# Patient Record
Sex: MALE | Race: OTHER | HISPANIC OR LATINO | URBAN - METROPOLITAN AREA
[De-identification: names, ages, dates, MRNs, and addresses within clinical notes are randomized per-mention and may not be internally consistent; named-entity substitution may affect disease eponyms.]

---

## 2017-10-28 ENCOUNTER — EMERGENCY (EMERGENCY)
Facility: HOSPITAL | Age: 57
LOS: 1 days | Discharge: HOME | End: 2017-10-28
Attending: EMERGENCY MEDICINE | Admitting: EMERGENCY MEDICINE
Payer: COMMERCIAL

## 2017-10-28 VITALS
TEMPERATURE: 99 F | SYSTOLIC BLOOD PRESSURE: 142 MMHG | RESPIRATION RATE: 16 BRPM | HEART RATE: 97 BPM | DIASTOLIC BLOOD PRESSURE: 81 MMHG | OXYGEN SATURATION: 99 %

## 2017-10-28 VITALS
OXYGEN SATURATION: 98 % | WEIGHT: 244.93 LBS | RESPIRATION RATE: 18 BRPM | SYSTOLIC BLOOD PRESSURE: 173 MMHG | DIASTOLIC BLOOD PRESSURE: 101 MMHG | HEART RATE: 109 BPM | TEMPERATURE: 98 F | HEIGHT: 69 IN

## 2017-10-28 DIAGNOSIS — S89.92XA UNSPECIFIED INJURY OF LEFT LOWER LEG, INITIAL ENCOUNTER: ICD-10-CM
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
## 2017-10-28 DIAGNOSIS — S09.90XA UNSPECIFIED INJURY OF HEAD, INITIAL ENCOUNTER: ICD-10-CM

## 2017-10-28 DIAGNOSIS — S89.91XA UNSPECIFIED INJURY OF RIGHT LOWER LEG, INITIAL ENCOUNTER: ICD-10-CM
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
## 2017-10-28 DIAGNOSIS — Z87.891 PERSONAL HISTORY OF NICOTINE DEPENDENCE: ICD-10-CM

## 2017-10-28 PROCEDURE — 73562 X-RAY EXAM OF KNEE 3: CPT | Mod: 26,50

## 2017-10-28 PROCEDURE — 99284 EMERGENCY DEPT VISIT MOD MDM: CPT | Mod: 25

## 2017-10-28 PROCEDURE — 73562 X-RAY EXAM OF KNEE 3: CPT

## 2017-10-28 PROCEDURE — 99284 EMERGENCY DEPT VISIT MOD MDM: CPT

## 2017-10-28 PROCEDURE — 72125 CT NECK SPINE W/O DYE: CPT

## 2017-10-28 PROCEDURE — 70450 CT HEAD/BRAIN W/O DYE: CPT

## 2017-10-28 PROCEDURE — 70450 CT HEAD/BRAIN W/O DYE: CPT | Mod: 26

## 2017-10-28 PROCEDURE — 72125 CT NECK SPINE W/O DYE: CPT | Mod: 26

## 2017-10-28 RX ORDER — IBUPROFEN 200 MG
600 TABLET ORAL ONCE
Qty: 0 | Refills: 0 | Status: COMPLETED | OUTPATIENT
Start: 2017-10-28 | End: 2017-10-28

## 2017-10-28 RX ADMIN — Medication 600 MILLIGRAM(S): at 15:01

## 2017-10-28 NOTE — ED PROVIDER NOTE - OBJECTIVE STATEMENT
pt with hx htn, low back pain and b/l meniscus surg c/o headache , b/l knee pain s/p fall on steps at home last night. pt relates was at least half way up steps carrying somethings, slipped fell backwards. no neck or back pain, weakness, numbness, cp, sob, abd pain, arm pain.  pmd - guttierez  ortho - none

## 2017-10-28 NOTE — ED PROVIDER NOTE - CONDUCTED A DETAILED DISCUSSION WITH PATIENT AND/OR GUARDIAN REGARDING, MDM
return to ED if symptoms worsen, persist or questions arise/need for outpatient follow-up need for outpatient follow-up/radiology results/return to ED if symptoms worsen, persist or questions arise

## 2017-10-28 NOTE — ED PROVIDER NOTE - CARE PLAN
Principal Discharge DX:	Fall, initial encounter  Secondary Diagnosis:	Knee injury, initial encounter  Secondary Diagnosis:	Injury of head, initial encounter

## 2017-10-28 NOTE — ED ADULT NURSE NOTE - OBJECTIVE STATEMENT
Pt received on wheel chair , alert and oriented x3, c/o lower leg pain. Pt stated he fell at home, landed on both knees, 10/10 sharp pain radiating to leg, pt took aleve and advil at home, no relief, pain worsen and is now constant. Pt denies hitting his head, denies loss of consciousness,  pt stated occasional numbness and tingling. No acute s/s of distress noted. No visible injuries or bleeding noted.

## 2020-06-30 ENCOUNTER — RESULT REVIEW (OUTPATIENT)
Age: 60
End: 2020-06-30

## 2020-11-27 PROBLEM — M54.5 LOW BACK PAIN: Chronic | Status: ACTIVE | Noted: 2017-10-28

## 2020-12-01 ENCOUNTER — OUTPATIENT (OUTPATIENT)
Dept: OUTPATIENT SERVICES | Facility: HOSPITAL | Age: 60
LOS: 1 days | End: 2020-12-01
Payer: COMMERCIAL

## 2020-12-01 VITALS
RESPIRATION RATE: 16 BRPM | OXYGEN SATURATION: 100 % | SYSTOLIC BLOOD PRESSURE: 145 MMHG | HEIGHT: 70 IN | DIASTOLIC BLOOD PRESSURE: 94 MMHG | WEIGHT: 242.95 LBS | HEART RATE: 79 BPM | TEMPERATURE: 99 F

## 2020-12-01 DIAGNOSIS — Z01.818 ENCOUNTER FOR OTHER PREPROCEDURAL EXAMINATION: ICD-10-CM

## 2020-12-01 DIAGNOSIS — M17.11 UNILATERAL PRIMARY OSTEOARTHRITIS, RIGHT KNEE: ICD-10-CM

## 2020-12-01 LAB
A1C WITH ESTIMATED AVERAGE GLUCOSE RESULT: 5.2 % — SIGNIFICANT CHANGE UP (ref 4–5.6)
ALBUMIN SERPL ELPH-MCNC: 4.1 G/DL — SIGNIFICANT CHANGE UP (ref 3.3–5)
ALP SERPL-CCNC: 89 U/L — SIGNIFICANT CHANGE UP (ref 40–120)
ALT FLD-CCNC: 29 U/L — SIGNIFICANT CHANGE UP (ref 12–78)
ANION GAP SERPL CALC-SCNC: 5 MMOL/L — SIGNIFICANT CHANGE UP (ref 5–17)
APTT BLD: 30.7 SEC — SIGNIFICANT CHANGE UP (ref 27.5–35.5)
AST SERPL-CCNC: 16 U/L — SIGNIFICANT CHANGE UP (ref 15–37)
BILIRUB SERPL-MCNC: 0.5 MG/DL — SIGNIFICANT CHANGE UP (ref 0.2–1.2)
BUN SERPL-MCNC: 23 MG/DL — SIGNIFICANT CHANGE UP (ref 7–23)
CALCIUM SERPL-MCNC: 9.5 MG/DL — SIGNIFICANT CHANGE UP (ref 8.5–10.1)
CHLORIDE SERPL-SCNC: 109 MMOL/L — HIGH (ref 96–108)
CO2 SERPL-SCNC: 29 MMOL/L — SIGNIFICANT CHANGE UP (ref 22–31)
CREAT SERPL-MCNC: 1.6 MG/DL — HIGH (ref 0.5–1.3)
ESTIMATED AVERAGE GLUCOSE: 103 MG/DL — SIGNIFICANT CHANGE UP (ref 68–114)
GLUCOSE SERPL-MCNC: 104 MG/DL — HIGH (ref 70–99)
HCT VFR BLD CALC: 41.6 % — SIGNIFICANT CHANGE UP (ref 39–50)
HGB BLD-MCNC: 13.5 G/DL — SIGNIFICANT CHANGE UP (ref 13–17)
INR BLD: 1.05 RATIO — SIGNIFICANT CHANGE UP (ref 0.88–1.16)
MCHC RBC-ENTMCNC: 28.2 PG — SIGNIFICANT CHANGE UP (ref 27–34)
MCHC RBC-ENTMCNC: 32.5 GM/DL — SIGNIFICANT CHANGE UP (ref 32–36)
MCV RBC AUTO: 86.8 FL — SIGNIFICANT CHANGE UP (ref 80–100)
MRSA PCR RESULT.: SIGNIFICANT CHANGE UP
NRBC # BLD: 0 /100 WBCS — SIGNIFICANT CHANGE UP (ref 0–0)
PLATELET # BLD AUTO: 270 K/UL — SIGNIFICANT CHANGE UP (ref 150–400)
POTASSIUM SERPL-MCNC: 4.1 MMOL/L — SIGNIFICANT CHANGE UP (ref 3.5–5.3)
POTASSIUM SERPL-SCNC: 4.1 MMOL/L — SIGNIFICANT CHANGE UP (ref 3.5–5.3)
PROT SERPL-MCNC: 8.2 G/DL — SIGNIFICANT CHANGE UP (ref 6–8.3)
PROTHROM AB SERPL-ACNC: 12.3 SEC — SIGNIFICANT CHANGE UP (ref 10.6–13.6)
RBC # BLD: 4.79 M/UL — SIGNIFICANT CHANGE UP (ref 4.2–5.8)
RBC # FLD: 14.4 % — SIGNIFICANT CHANGE UP (ref 10.3–14.5)
S AUREUS DNA NOSE QL NAA+PROBE: SIGNIFICANT CHANGE UP
SODIUM SERPL-SCNC: 143 MMOL/L — SIGNIFICANT CHANGE UP (ref 135–145)
WBC # BLD: 6.65 K/UL — SIGNIFICANT CHANGE UP (ref 3.8–10.5)
WBC # FLD AUTO: 6.65 K/UL — SIGNIFICANT CHANGE UP (ref 3.8–10.5)

## 2020-12-01 PROCEDURE — 36415 COLL VENOUS BLD VENIPUNCTURE: CPT

## 2020-12-01 PROCEDURE — 93010 ELECTROCARDIOGRAM REPORT: CPT

## 2020-12-01 PROCEDURE — 93005 ELECTROCARDIOGRAM TRACING: CPT

## 2020-12-01 PROCEDURE — 85730 THROMBOPLASTIN TIME PARTIAL: CPT

## 2020-12-01 PROCEDURE — 73562 X-RAY EXAM OF KNEE 3: CPT

## 2020-12-01 PROCEDURE — 86900 BLOOD TYPING SEROLOGIC ABO: CPT

## 2020-12-01 PROCEDURE — 73562 X-RAY EXAM OF KNEE 3: CPT | Mod: 26,RT

## 2020-12-01 PROCEDURE — 86850 RBC ANTIBODY SCREEN: CPT

## 2020-12-01 PROCEDURE — 87641 MR-STAPH DNA AMP PROBE: CPT

## 2020-12-01 PROCEDURE — 86901 BLOOD TYPING SEROLOGIC RH(D): CPT

## 2020-12-01 PROCEDURE — 83036 HEMOGLOBIN GLYCOSYLATED A1C: CPT

## 2020-12-01 PROCEDURE — 87640 STAPH A DNA AMP PROBE: CPT

## 2020-12-01 PROCEDURE — 85027 COMPLETE CBC AUTOMATED: CPT

## 2020-12-01 PROCEDURE — G0463: CPT

## 2020-12-01 PROCEDURE — 80053 COMPREHEN METABOLIC PANEL: CPT

## 2020-12-01 PROCEDURE — 85610 PROTHROMBIN TIME: CPT

## 2020-12-01 NOTE — H&P PST ADULT - NSICDXPROBLEM_GEN_ALL_CORE_FT
PROBLEM DIAGNOSES  Problem: Unilateral primary osteoarthritis, right knee  Assessment and Plan: check labs cbc cmp ptp tt type and screen hgb a1c mrsa  ekg  medical clearance  hibiclens x3  days with written and verbal instructions givne  patient is aware that he will be tested for covid and should socially isolate after test  patient is aware that if he is positive for mrsa he will be treated with mupirocin ointment written and verbal instrucions wee given  patient is aware that if his ghgb a1c is 9 or greater he will need to see endocrinologist  take losartan with a sip of water the morning of surgery  stop aspirin 7 days before surgery  preop instructions were amanda and patient verbalized understanding

## 2020-12-01 NOTE — H&P PST ADULT - NSICDXPASTMEDICALHX_GEN_ALL_CORE_FT
PAST MEDICAL HISTORY:  Hypertension     Knee injury     Low back pain     Unilateral primary osteoarthritis, right knee

## 2020-12-01 NOTE — H&P PST ADULT - NSANTHSNORERD_ENT_A_CORE
PAST MEDICAL HISTORY:  Cancer, bladder, neck     Chronic back pain     GERD with apnea     Hepatitis B ?    Hiatal hernia     Neck pain LTD ROM    Other osteoarthritis of spine, lumbosacral region     PUD (peptic ulcer disease)     Tingling sensation head    Vertigo Yes

## 2020-12-01 NOTE — H&P PST ADULT - CIGARETTES, PACKS/DAY
You can access the FollowMyHealth Patient Portal offered by [facility] by registering at the following website: [email or web address]. By joining [employer]’s FollowMyHealth portal, you will also be able to view your health information using other applications (apps) compatible with our system. 1

## 2020-12-01 NOTE — H&P PST ADULT - NSICDXPASTSURGICALHX_GEN_ALL_CORE_FT
PAST SURGICAL HISTORY:  H/O hammer toe correction July 2020    H/O knee surgery left replacement 10/28/20

## 2020-12-01 NOTE — H&P PST ADULT - HISTORY OF PRESENT ILLNESS
59 yo male scheduled for Right Total Knee Replacement on 12/14/20 with Dr Thomas.  Patient states he has paIin in the right knee for 20 years.  Pain is currently 7/10.  Elevation improves pain

## 2020-12-11 ENCOUNTER — OUTPATIENT (OUTPATIENT)
Dept: OUTPATIENT SERVICES | Facility: HOSPITAL | Age: 60
LOS: 1 days | End: 2020-12-11
Payer: COMMERCIAL

## 2020-12-11 DIAGNOSIS — Z11.59 ENCOUNTER FOR SCREENING FOR OTHER VIRAL DISEASES: ICD-10-CM

## 2020-12-11 LAB — SARS-COV-2 RNA SPEC QL NAA+PROBE: SIGNIFICANT CHANGE UP

## 2020-12-11 PROCEDURE — U0003: CPT

## 2020-12-13 ENCOUNTER — TRANSCRIPTION ENCOUNTER (OUTPATIENT)
Age: 60
End: 2020-12-13

## 2020-12-14 ENCOUNTER — INPATIENT (INPATIENT)
Facility: HOSPITAL | Age: 60
LOS: 0 days | Discharge: HOME | DRG: 470 | End: 2020-12-15
Attending: ORTHOPAEDIC SURGERY | Admitting: ORTHOPAEDIC SURGERY
Payer: COMMERCIAL

## 2020-12-14 ENCOUNTER — RESULT REVIEW (OUTPATIENT)
Age: 60
End: 2020-12-14

## 2020-12-14 ENCOUNTER — TRANSCRIPTION ENCOUNTER (OUTPATIENT)
Age: 60
End: 2020-12-14

## 2020-12-14 VITALS
DIASTOLIC BLOOD PRESSURE: 146 MMHG | HEART RATE: 90 BPM | OXYGEN SATURATION: 97 % | SYSTOLIC BLOOD PRESSURE: 189 MMHG | WEIGHT: 242.95 LBS | RESPIRATION RATE: 16 BRPM | TEMPERATURE: 100 F | HEIGHT: 70 IN

## 2020-12-14 DIAGNOSIS — M17.11 UNILATERAL PRIMARY OSTEOARTHRITIS, RIGHT KNEE: ICD-10-CM

## 2020-12-14 DIAGNOSIS — I10 ESSENTIAL (PRIMARY) HYPERTENSION: ICD-10-CM

## 2020-12-14 DIAGNOSIS — N17.9 ACUTE KIDNEY FAILURE, UNSPECIFIED: ICD-10-CM

## 2020-12-14 LAB
ABO RH CONFIRMATION: SIGNIFICANT CHANGE UP
HCT VFR BLD CALC: 39.5 % — SIGNIFICANT CHANGE UP (ref 39–50)
HGB BLD-MCNC: 12.7 G/DL — LOW (ref 13–17)
MCHC RBC-ENTMCNC: 28 PG — SIGNIFICANT CHANGE UP (ref 27–34)
MCHC RBC-ENTMCNC: 32.2 GM/DL — SIGNIFICANT CHANGE UP (ref 32–36)
MCV RBC AUTO: 87 FL — SIGNIFICANT CHANGE UP (ref 80–100)
NRBC # BLD: 0 /100 WBCS — SIGNIFICANT CHANGE UP (ref 0–0)
PLATELET # BLD AUTO: 357 K/UL — SIGNIFICANT CHANGE UP (ref 150–400)
RBC # BLD: 4.54 M/UL — SIGNIFICANT CHANGE UP (ref 4.2–5.8)
RBC # FLD: 14.1 % — SIGNIFICANT CHANGE UP (ref 10.3–14.5)
WBC # BLD: 14.13 K/UL — HIGH (ref 3.8–10.5)
WBC # FLD AUTO: 14.13 K/UL — HIGH (ref 3.8–10.5)

## 2020-12-14 PROCEDURE — 73562 X-RAY EXAM OF KNEE 3: CPT | Mod: 26,RT

## 2020-12-14 PROCEDURE — 88305 TISSUE EXAM BY PATHOLOGIST: CPT | Mod: 26

## 2020-12-14 PROCEDURE — 88311 DECALCIFY TISSUE: CPT | Mod: 26

## 2020-12-14 RX ORDER — ONDANSETRON 8 MG/1
4 TABLET, FILM COATED ORAL EVERY 6 HOURS
Refills: 0 | Status: DISCONTINUED | OUTPATIENT
Start: 2020-12-14 | End: 2020-12-15

## 2020-12-14 RX ORDER — SODIUM CHLORIDE 9 MG/ML
1000 INJECTION INTRAMUSCULAR; INTRAVENOUS; SUBCUTANEOUS
Refills: 0 | Status: DISCONTINUED | OUTPATIENT
Start: 2020-12-14 | End: 2020-12-15

## 2020-12-14 RX ORDER — MAGNESIUM HYDROXIDE 400 MG/1
30 TABLET, CHEWABLE ORAL DAILY
Refills: 0 | Status: DISCONTINUED | OUTPATIENT
Start: 2020-12-14 | End: 2020-12-15

## 2020-12-14 RX ORDER — FERROUS SULFATE 325(65) MG
325 TABLET ORAL
Refills: 0 | Status: DISCONTINUED | OUTPATIENT
Start: 2020-12-14 | End: 2020-12-15

## 2020-12-14 RX ORDER — FOLIC ACID 0.8 MG
1 TABLET ORAL DAILY
Refills: 0 | Status: DISCONTINUED | OUTPATIENT
Start: 2020-12-14 | End: 2020-12-15

## 2020-12-14 RX ORDER — CEFAZOLIN SODIUM 1 G
2000 VIAL (EA) INJECTION ONCE
Refills: 0 | Status: COMPLETED | OUTPATIENT
Start: 2020-12-14 | End: 2020-12-14

## 2020-12-14 RX ORDER — RIVAROXABAN 15 MG-20MG
10 KIT ORAL DAILY
Refills: 0 | Status: DISCONTINUED | OUTPATIENT
Start: 2020-12-15 | End: 2020-12-15

## 2020-12-14 RX ORDER — SODIUM CHLORIDE 9 MG/ML
1000 INJECTION, SOLUTION INTRAVENOUS
Refills: 0 | Status: DISCONTINUED | OUTPATIENT
Start: 2020-12-14 | End: 2020-12-14

## 2020-12-14 RX ORDER — OXYCODONE HYDROCHLORIDE 5 MG/1
5 TABLET ORAL ONCE
Refills: 0 | Status: DISCONTINUED | OUTPATIENT
Start: 2020-12-14 | End: 2020-12-14

## 2020-12-14 RX ORDER — HYDROMORPHONE HYDROCHLORIDE 2 MG/ML
0.5 INJECTION INTRAMUSCULAR; INTRAVENOUS; SUBCUTANEOUS
Refills: 0 | Status: DISCONTINUED | OUTPATIENT
Start: 2020-12-14 | End: 2020-12-14

## 2020-12-14 RX ORDER — ONDANSETRON 8 MG/1
4 TABLET, FILM COATED ORAL ONCE
Refills: 0 | Status: DISCONTINUED | OUTPATIENT
Start: 2020-12-14 | End: 2020-12-14

## 2020-12-14 RX ORDER — TRAMADOL HYDROCHLORIDE 50 MG/1
50 TABLET ORAL EVERY 4 HOURS
Refills: 0 | Status: DISCONTINUED | OUTPATIENT
Start: 2020-12-14 | End: 2020-12-15

## 2020-12-14 RX ORDER — ACETAMINOPHEN 500 MG
1000 TABLET ORAL ONCE
Refills: 0 | Status: COMPLETED | OUTPATIENT
Start: 2020-12-15 | End: 2020-12-15

## 2020-12-14 RX ORDER — ASPIRIN/CALCIUM CARB/MAGNESIUM 324 MG
325 TABLET ORAL DAILY
Refills: 0 | Status: DISCONTINUED | OUTPATIENT
Start: 2020-12-14 | End: 2020-12-15

## 2020-12-14 RX ORDER — TRAMADOL HYDROCHLORIDE 50 MG/1
100 TABLET ORAL EVERY 8 HOURS
Refills: 0 | Status: DISCONTINUED | OUTPATIENT
Start: 2020-12-14 | End: 2020-12-15

## 2020-12-14 RX ORDER — ACETAMINOPHEN 500 MG
1000 TABLET ORAL ONCE
Refills: 0 | Status: COMPLETED | OUTPATIENT
Start: 2020-12-14 | End: 2020-12-14

## 2020-12-14 RX ORDER — BUPIVACAINE 13.3 MG/ML
20 INJECTION, SUSPENSION, LIPOSOMAL INFILTRATION ONCE
Refills: 0 | Status: COMPLETED | OUTPATIENT
Start: 2020-12-14 | End: 2020-12-14

## 2020-12-14 RX ORDER — ACETAMINOPHEN 500 MG
650 TABLET ORAL EVERY 6 HOURS
Refills: 0 | Status: DISCONTINUED | OUTPATIENT
Start: 2020-12-14 | End: 2020-12-15

## 2020-12-14 RX ORDER — SENNA PLUS 8.6 MG/1
2 TABLET ORAL AT BEDTIME
Refills: 0 | Status: DISCONTINUED | OUTPATIENT
Start: 2020-12-14 | End: 2020-12-15

## 2020-12-14 RX ORDER — LOSARTAN POTASSIUM 100 MG/1
50 TABLET, FILM COATED ORAL DAILY
Refills: 0 | Status: DISCONTINUED | OUTPATIENT
Start: 2020-12-14 | End: 2020-12-14

## 2020-12-14 RX ORDER — ASCORBIC ACID 60 MG
500 TABLET,CHEWABLE ORAL
Refills: 0 | Status: DISCONTINUED | OUTPATIENT
Start: 2020-12-14 | End: 2020-12-15

## 2020-12-14 RX ADMIN — SODIUM CHLORIDE 75 MILLILITER(S): 9 INJECTION, SOLUTION INTRAVENOUS at 14:35

## 2020-12-14 RX ADMIN — Medication 400 MILLIGRAM(S): at 21:00

## 2020-12-14 RX ADMIN — Medication 1000 MILLIGRAM(S): at 21:45

## 2020-12-14 RX ADMIN — HYDROMORPHONE HYDROCHLORIDE 0.5 MILLIGRAM(S): 2 INJECTION INTRAMUSCULAR; INTRAVENOUS; SUBCUTANEOUS at 14:50

## 2020-12-14 RX ADMIN — SODIUM CHLORIDE 75 MILLILITER(S): 9 INJECTION, SOLUTION INTRAVENOUS at 08:37

## 2020-12-14 RX ADMIN — HYDROMORPHONE HYDROCHLORIDE 0.5 MILLIGRAM(S): 2 INJECTION INTRAMUSCULAR; INTRAVENOUS; SUBCUTANEOUS at 15:05

## 2020-12-14 RX ADMIN — HYDROMORPHONE HYDROCHLORIDE 0.5 MILLIGRAM(S): 2 INJECTION INTRAMUSCULAR; INTRAVENOUS; SUBCUTANEOUS at 14:16

## 2020-12-14 RX ADMIN — HYDROMORPHONE HYDROCHLORIDE 0.5 MILLIGRAM(S): 2 INJECTION INTRAMUSCULAR; INTRAVENOUS; SUBCUTANEOUS at 14:31

## 2020-12-14 RX ADMIN — OXYCODONE HYDROCHLORIDE 5 MILLIGRAM(S): 5 TABLET ORAL at 17:45

## 2020-12-14 RX ADMIN — Medication 100 MILLIGRAM(S): at 21:51

## 2020-12-14 RX ADMIN — HYDROMORPHONE HYDROCHLORIDE 0.5 MILLIGRAM(S): 2 INJECTION INTRAMUSCULAR; INTRAVENOUS; SUBCUTANEOUS at 14:48

## 2020-12-14 RX ADMIN — HYDROMORPHONE HYDROCHLORIDE 0.5 MILLIGRAM(S): 2 INJECTION INTRAMUSCULAR; INTRAVENOUS; SUBCUTANEOUS at 14:33

## 2020-12-14 NOTE — DISCHARGE NOTE PROVIDER - PROVIDER TOKENS
PROVIDER:[TOKEN:[[id number]]] PROVIDER:[TOKEN:[[id number]]],PROVIDER:[TOKEN:[[id number]],FOLLOWUP:[1 week]]

## 2020-12-14 NOTE — PHYSICAL THERAPY INITIAL EVALUATION ADULT - RANGE OF MOTION EXAMINATION, REHAB EVAL
bilateral upper extremity ROM was WNL (within normal limits)/Left LE ROM was WNL (within normal limits)/R Knee: 0-80

## 2020-12-14 NOTE — DISCHARGE NOTE PROVIDER - CARE PROVIDER_API CALL
Mendez Thomas  ORTHOPAEDIC SURGERY  [address] [place] [address]  Phone: [phone]  Fax: [phone]  Follow Up Time:    Mendez Thomas  ORTHOPAEDIC SURGERY  [address] [place] [address]  Phone: [phone]  Fax: [phone]  Follow Up Time:     [facility]  [address] [place] [address]  Phone: [phone]  Fax: [phone]  Follow Up Time: 1 week

## 2020-12-14 NOTE — DISCHARGE NOTE PROVIDER - HOSPITAL COURSE
59 y/o evaluated in the office for right knee pain with endstage osteoarthritis. Patient underwent R TKA. Postoperatively, the patient was anticoagulated with Xarelto and was given 24 hours of IV antibiotics. Pain was well tolerated. Incentive spirometry encouraged. Labs/H/H trended. A social service consult was obtained for discharge planning. A PT consult was obtained for weight bearing as tolerated. Patient had uncomplicated postoperative hospital course.    Dispo: home with home PT, outpatient follow up with Dr. Thomas

## 2020-12-14 NOTE — PHYSICAL THERAPY INITIAL EVALUATION ADULT - TRANSFER TRAINING, PT EVAL
Patient will transfer from all surfaces in 1 week in order to safely transition between surfaces at home

## 2020-12-14 NOTE — DISCHARGE NOTE PROVIDER - CARE PROVIDERS DIRECT ADDRESSES
,DirectAddress_Unknown ,DirectAddress_Unknown,[email or web address]

## 2020-12-14 NOTE — CONSULT NOTE ADULT - SUBJECTIVE AND OBJECTIVE BOX
Patient is a 60y old  Male who presents with a chief complaint of Right Total Knee Replacement (01 Dec 2020 10:49)  feels well, other than mild post op discomfort      INTERVAL HPI/OVERNIGHT EVENTS:  T(C): 37.6 (12-14-20 @ 22:06), Max: 37.7 (12-14-20 @ 08:13)  HR: 100 (12-14-20 @ 22:06) (61 - 109)  BP: 120/77 (12-14-20 @ 22:06) (111/61 - 139/88)  RR: 18 (12-14-20 @ 22:06) (12 - 20)  SpO2: 93% (12-14-20 @ 22:06) (93% - 100%)  Wt(kg): --  I&O's Summary    14 Dec 2020 07:01  -  14 Dec 2020 22:08  --------------------------------------------------------  IN: 905 mL / OUT: 0 mL / NET: 905 mL        PAST MEDICAL & SURGICAL HISTORY:  Hypertension    Unilateral primary osteoarthritis, right knee    Low back pain    Knee injury    H/O hammer toe correction  July 2020    H/O knee surgery  left replacement 10/28/20        SOCIAL HISTORY  Alcohol:  Tobacco:  Illicit substance use:      FAMILY HISTORY:    Home Medications:  aspirin 325 mg oral tablet: orally once a day (14 Dec 2020 14:14)  losartan: orally once a day (14 Dec 2020 14:14)  traMADol: 50 mg four times a day (14 Dec 2020 14:14)        LABS:                        12.7   14.13 )-----------( 357      ( 14 Dec 2020 16:11 )             39.5               CAPILLARY BLOOD GLUCOSE      POCT Blood Glucose.: 121 mg/dL (14 Dec 2020 14:08)  POCT Blood Glucose.: 100 mg/dL (14 Dec 2020 09:05)            MEDICATIONS  (STANDING):  ascorbic acid 500 milliGRAM(s) Oral two times a day  aspirin enteric coated 325 milliGRAM(s) Oral daily  clindamycin IVPB 900 milliGRAM(s) IV Intermittent every 8 hours  ferrous    sulfate 325 milliGRAM(s) Oral two times a day  folic acid 1 milliGRAM(s) Oral daily    MEDICATIONS  (PRN):  acetaminophen   Tablet .. 650 milliGRAM(s) Oral every 6 hours PRN Temp greater or equal to 38C (100.4F), Mild Pain (1 - 3)  aluminum hydroxide/magnesium hydroxide/simethicone Suspension 30 milliLiter(s) Oral four times a day PRN Indigestion  magnesium hydroxide Suspension 30 milliLiter(s) Oral daily PRN Constipation  ondansetron Injectable 4 milliGRAM(s) IV Push every 6 hours PRN Nausea and/or Vomiting  senna 2 Tablet(s) Oral at bedtime PRN Constipation  traMADol 100 milliGRAM(s) Oral every 8 hours PRN Severe Pain (7 - 10)  traMADol 50 milliGRAM(s) Oral every 4 hours PRN Moderate Pain (4 - 6)      REVIEW OF SYSTEMS:  CONSTITUTIONAL: No fever, weight loss, or fatigue  EYES: No eye pain, visual disturbances, or discharge  ENMT:  No difficulty hearing, tinnitus, vertigo; No sinus or throat pain  NECK: No pain or stiffness  RESPIRATORY: No cough, wheezing, chills or hemoptysis; No shortness of breath  CARDIOVASCULAR: No chest pain, palpitations, dizziness, or leg swelling  GASTROINTESTINAL: No abdominal or epigastric pain. No nausea, vomiting, or hematemesis; No diarrhea or constipation. No melena or hematochezia.  GENITOURINARY: No dysuria, frequency, hematuria, or incontinence  NEUROLOGICAL: No headaches, memory loss, loss of strength, numbness, or tremors  SKIN: No itching, burning, rashes, or lesions   LYMPH NODES: No enlarged glands  ENDOCRINE: No heat or cold intolerance; No hair loss  MUSCULOSKELETAL: chronic bl knee pain  PSYCHIATRIC: No depression, anxiety, mood swings, or difficulty sleeping  HEME/LYMPH: No easy bruising, or bleeding gums  ALLERY AND IMMUNOLOGIC: No hives or eczema    RADIOLOGY & ADDITIONAL TESTS:    Imaging Personally Reviewed:  [ ] YES  [ ] NO    Consultant(s) Notes Reviewed:  [ ] YES  [ ] NO        PHYSICAL EXAM:  GENERAL: NAD, well-groomed, well-developed  HEAD:  Atraumatic, Normocephalic  EYES: EOMI, PERRLA, conjunctiva and sclera clear  ENMT: No tonsillar erythema, exudates, or enlargement; Moist mucous membranes, Good dentition, No lesions  NECK: Supple, No JVD, Normal thyroid  NERVOUS SYSTEM:  Alert & Oriented X3, Good concentration; Motor Strength 5/5 B/L upper and lower extremities; DTRs 2+ intact and symmetric  CHEST/LUNG: Clear to percussion bilaterally; No rales, rhonchi, wheezing, or rubs  HEART: Regular rate and rhythm; No murmurs, rubs, or gallops  ABDOMEN: Soft, Nontender, Nondistended; Bowel sounds present  EXTREMITIES:  2+ Peripheral Pulses, No clubbing, cyanosis, or edema  LYMPH: No lymphadenopathy noted  SKIN: No rashes or lesions    Care Discussed with Consultants/Other Providers [ ] YES  [ ] NO

## 2020-12-14 NOTE — DISCHARGE NOTE PROVIDER - NSDCFUADDINST_GEN_ALL_CORE_FT
Weight bearing as tolerated.  Xarelto 10mg daily for total of 12 days.    Keep Aquacel dressing clean and dry. It will be changed/removed on your next office visit OR you may remove after 8 days post hospital discharge.  Weight bearing as tolerated.  Xarelto 10mg daily for total of 12 days.    Keep Aquacel dressing clean and dry. It will be changed/removed on your next office visit OR you may remove after 8 days post hospital discharge.     You will continue Celebrex as prescribed by Dr. Thomas, and continue Tramadol for severe pain as prescribed by Dr. Kumar.   Weight bearing as tolerated.    TAKE ASPIRIN 325mg TWICE A DAY FOR 6 WEEKS AFTER SURGERY - to prevent blood clots.    Keep Aquacel dressing clean and dry. It will be changed/removed on your next office visit OR you may remove after 8 days post hospital discharge.     You will continue Celebrex as prescribed by Dr. Thomas, and continue Tramadol for severe pain as prescribed by Dr. Kumar.

## 2020-12-14 NOTE — DISCHARGE NOTE PROVIDER - NSDCMRMEDTOKEN_GEN_ALL_CORE_FT
aspirin 325 mg oral tablet: orally once a day  losartan: orally once a day  traMADol: 50 mg four times a day   aspirin 325 mg oral tablet: orally once a day  losartan: orally once a day  rolling walker: Dx: S/P right TKA   Date: 12/14  Dx: M17.11  BRE: 99 days  Weight: 110.2 kg  Height: 177.8 cm  Xarelto 10 mg oral tablet: 1 tab(s) orally once a day - take until finished   aspirin 325 mg oral tablet: orally twice a day for 6 weeks after surgery - to prevent blood clots   losartan: orally once a day  rolling walker: Dx: S/P right TKA   Date: 12/14  Dx: M17.11  BRE: 99 days  Weight: 110.2 kg  Height: 177.8 cm  Xarelto 10 mg oral tablet: 1 tab(s) orally once a day - take until finished   amLODIPine 5 mg oral tablet: 1 tab(s) orally once a day  aspirin 325 mg oral tablet: orally twice a day for 6 weeks after surgery - to prevent blood clots   losartan: orally once a day  rolling walker: Dx: S/P right TKA   Date: 12/14  Dx: M17.11  BRE: 99 days  Weight: 110.2 kg  Height: 177.8 cm  Xarelto 10 mg oral tablet: 1 tab(s) orally once a day - take until finished

## 2020-12-14 NOTE — PROGRESS NOTE ADULT - SUBJECTIVE AND OBJECTIVE BOX
Orthopaedic PA    Procedure: s/p R TKR  Surgeon: William    60y Male comfortable, without complaints. Denies chest pain, shortness of breath, palpitations, nausea, vomiting, dizziness, fevers, chills    PE:  Vital Signs Last 24 Hrs  T(C): 36.8 (14 Dec 2020 18:00), Max: 37.7 (14 Dec 2020 08:13)  T(F): 98.2 (14 Dec 2020 18:00), Max: 99.9 (14 Dec 2020 08:13)  HR: 99 (14 Dec 2020 18:38) (61 - 100)  BP: 135/88 (14 Dec 2020 18:38) (111/61 - 139/88)  BP(mean): --  RR: 19 (14 Dec 2020 18:38) (12 - 20)  SpO2: 98% (14 Dec 2020 18:38) (97% - 100%)  General:  A + O x 3 in NAD    Knee: R knee Aquacel CDI.   NSLT L2-S1.   5/5 DF/PF/EHL/FHL  2+ DP/2+ PT  No calf tenderness to palpation B/L.     LABS:                        12.7   14.13 )-----------( 357      ( 14 Dec 2020 16:11 )             39.5       A/P: 60y Male stable POD#0 s/p R TKR     - Pain control, supportive care  - Incentive spirometer  - DVT ppx: SCD / Chemical starting tomorrow pending H/H  - Ambulation as tolerated  - PT, OT  - F/U AM Labs  - Discharge planning to home with home PT

## 2020-12-14 NOTE — DISCHARGE NOTE PROVIDER - NSDCCPCAREPLAN_GEN_ALL_CORE_FT
PRINCIPAL DISCHARGE DIAGNOSIS  Diagnosis: Unilateral primary osteoarthritis, right knee  Assessment and Plan of Treatment:       SECONDARY DISCHARGE DIAGNOSES  Diagnosis: Unilateral primary osteoarthritis, right knee  Assessment and Plan of Treatment:      PRINCIPAL DISCHARGE DIAGNOSIS  Diagnosis: Hypertension  Assessment and Plan of Treatment: Hypertension- stop losartan, and start amlodipine 5mg daily      SECONDARY DISCHARGE DIAGNOSES  Diagnosis: MARY (acute kidney injury)  Assessment and Plan of Treatment: MARY (acute kidney injury)-  Creatinine 1.7 on discharge- avoid NSAIDS, eg celoxicib, meloxicam.  please follow up dr Watt early next week for repeat blood work (BMP)    Diagnosis: Hypertension  Assessment and Plan of Treatment: Hypertension- stop losartan, and start amlodipine 5mg daily    Diagnosis: Unilateral primary osteoarthritis, right knee  Assessment and Plan of Treatment:

## 2020-12-14 NOTE — PHYSICAL THERAPY INITIAL EVALUATION ADULT - ADDITIONAL COMMENTS
Patient lives in private home, +2 MANDIE then reports he will be staying on main level when he return home initially.  There is a 1/2 bath on main level.   Patient was independent in all ADLs and ambulated independently without device.  Patient has SAC.

## 2020-12-14 NOTE — PHYSICAL THERAPY INITIAL EVALUATION ADULT - HEALTH SCREEN CRITERIA
September 17, 2020    Christophe Rosales  [address]  [place] [address]             [facility] [address] [facility] [address]  Phone: [phone] Dear David [facility] is committed to your overall health and would like to ensure that you are up to date on your recommended test and/or procedures.   Glenis Cordova NP  has found that your chart shows you may be due for the following:    Health Maintenance Due   Topic Date Due    TETANUS VACCINE  09/15/1985    Shingles Vaccine (1 of 2) 09/15/2017    Colorectal Cancer Screening  09/15/2017    Influenza Vaccine (1) 08/01/2020     If you have had any of the above done at another facility, please let us know so that we may obtain copies from that facility.  If you have a copy of these records, please provide a copy for us to scan into your chart.  You are welcome to request that the report be faxed to us at  ([phone]).     Otherwise, please schedule these appointments at your earliest convenience by calling [phone] or going to [email or web address].    If you have an upcoming scheduled appointment for the item above, please disregard this letter.    Sincerely,  Your [facility]  MILY Wong L.P.N. Clinical Care Coordinator  [address] [place] [address], [place] [address] [phone]          yes

## 2020-12-14 NOTE — DISCHARGE NOTE PROVIDER - NSDCACTIVITY_GEN_ALL_CORE
Do not drive or operate machinery/Do not make important decisions/Walking - Indoors allowed/No heavy lifting/straining/Walking - Outdoors allowed

## 2020-12-15 ENCOUNTER — TRANSCRIPTION ENCOUNTER (OUTPATIENT)
Age: 60
End: 2020-12-15

## 2020-12-15 VITALS
SYSTOLIC BLOOD PRESSURE: 128 MMHG | DIASTOLIC BLOOD PRESSURE: 80 MMHG | HEART RATE: 98 BPM | OXYGEN SATURATION: 98 % | RESPIRATION RATE: 20 BRPM | TEMPERATURE: 99 F

## 2020-12-15 DIAGNOSIS — Z71.89 OTHER SPECIFIED COUNSELING: ICD-10-CM

## 2020-12-15 LAB
ANION GAP SERPL CALC-SCNC: 7 MMOL/L — SIGNIFICANT CHANGE UP (ref 5–17)
BUN SERPL-MCNC: 29 MG/DL — HIGH (ref 7–23)
CALCIUM SERPL-MCNC: 9.1 MG/DL — SIGNIFICANT CHANGE UP (ref 8.5–10.1)
CHLORIDE SERPL-SCNC: 107 MMOL/L — SIGNIFICANT CHANGE UP (ref 96–108)
CO2 SERPL-SCNC: 28 MMOL/L — SIGNIFICANT CHANGE UP (ref 22–31)
CREAT SERPL-MCNC: 1.7 MG/DL — HIGH (ref 0.5–1.3)
GLUCOSE SERPL-MCNC: 133 MG/DL — HIGH (ref 70–99)
HCT VFR BLD CALC: 33.5 % — LOW (ref 39–50)
HGB BLD-MCNC: 11 G/DL — LOW (ref 13–17)
MCHC RBC-ENTMCNC: 28.2 PG — SIGNIFICANT CHANGE UP (ref 27–34)
MCHC RBC-ENTMCNC: 32.8 GM/DL — SIGNIFICANT CHANGE UP (ref 32–36)
MCV RBC AUTO: 85.9 FL — SIGNIFICANT CHANGE UP (ref 80–100)
NRBC # BLD: 0 /100 WBCS — SIGNIFICANT CHANGE UP (ref 0–0)
PLATELET # BLD AUTO: 360 K/UL — SIGNIFICANT CHANGE UP (ref 150–400)
POTASSIUM SERPL-MCNC: 5 MMOL/L — SIGNIFICANT CHANGE UP (ref 3.5–5.3)
POTASSIUM SERPL-SCNC: 5 MMOL/L — SIGNIFICANT CHANGE UP (ref 3.5–5.3)
RBC # BLD: 3.9 M/UL — LOW (ref 4.2–5.8)
RBC # FLD: 14.1 % — SIGNIFICANT CHANGE UP (ref 10.3–14.5)
SARS-COV-2 RNA SPEC QL NAA+PROBE: SIGNIFICANT CHANGE UP
SODIUM SERPL-SCNC: 142 MMOL/L — SIGNIFICANT CHANGE UP (ref 135–145)
WBC # BLD: 16.62 K/UL — HIGH (ref 3.8–10.5)
WBC # FLD AUTO: 16.62 K/UL — HIGH (ref 3.8–10.5)

## 2020-12-15 PROCEDURE — 97165 OT EVAL LOW COMPLEX 30 MIN: CPT

## 2020-12-15 PROCEDURE — 97116 GAIT TRAINING THERAPY: CPT

## 2020-12-15 PROCEDURE — 88311 DECALCIFY TISSUE: CPT

## 2020-12-15 PROCEDURE — C1776: CPT

## 2020-12-15 PROCEDURE — 73562 X-RAY EXAM OF KNEE 3: CPT

## 2020-12-15 PROCEDURE — 97530 THERAPEUTIC ACTIVITIES: CPT

## 2020-12-15 PROCEDURE — C1713: CPT

## 2020-12-15 PROCEDURE — U0003: CPT

## 2020-12-15 PROCEDURE — 85027 COMPLETE CBC AUTOMATED: CPT

## 2020-12-15 PROCEDURE — 97161 PT EVAL LOW COMPLEX 20 MIN: CPT

## 2020-12-15 PROCEDURE — 88305 TISSUE EXAM BY PATHOLOGIST: CPT

## 2020-12-15 PROCEDURE — 82962 GLUCOSE BLOOD TEST: CPT

## 2020-12-15 PROCEDURE — 80048 BASIC METABOLIC PNL TOTAL CA: CPT

## 2020-12-15 PROCEDURE — 36415 COLL VENOUS BLD VENIPUNCTURE: CPT

## 2020-12-15 RX ORDER — ASPIRIN/CALCIUM CARB/MAGNESIUM 324 MG
0 TABLET ORAL
Qty: 0 | Refills: 0 | DISCHARGE

## 2020-12-15 RX ORDER — RIVAROXABAN 15 MG-20MG
1 KIT ORAL
Qty: 11 | Refills: 0
Start: 2020-12-15 | End: 2020-12-25

## 2020-12-15 RX ORDER — TRAMADOL HYDROCHLORIDE 50 MG/1
1 TABLET ORAL
Qty: 28 | Refills: 0
Start: 2020-12-15 | End: 2020-12-21

## 2020-12-15 RX ORDER — AMLODIPINE BESYLATE 2.5 MG/1
5 TABLET ORAL DAILY
Refills: 0 | Status: DISCONTINUED | OUTPATIENT
Start: 2020-12-15 | End: 2020-12-15

## 2020-12-15 RX ORDER — TRAMADOL HYDROCHLORIDE 50 MG/1
0 TABLET ORAL
Qty: 0 | Refills: 0 | DISCHARGE

## 2020-12-15 RX ORDER — LANOLIN ALCOHOL/MO/W.PET/CERES
5 CREAM (GRAM) TOPICAL ONCE
Refills: 0 | Status: COMPLETED | OUTPATIENT
Start: 2020-12-15 | End: 2020-12-15

## 2020-12-15 RX ORDER — AMLODIPINE BESYLATE 2.5 MG/1
1 TABLET ORAL
Qty: 30 | Refills: 0
Start: 2020-12-15 | End: 2021-01-13

## 2020-12-15 RX ADMIN — TRAMADOL HYDROCHLORIDE 100 MILLIGRAM(S): 50 TABLET ORAL at 02:25

## 2020-12-15 RX ADMIN — Medication 325 MILLIGRAM(S): at 17:16

## 2020-12-15 RX ADMIN — Medication 1000 MILLIGRAM(S): at 05:00

## 2020-12-15 RX ADMIN — Medication 400 MILLIGRAM(S): at 04:10

## 2020-12-15 RX ADMIN — Medication 325 MILLIGRAM(S): at 05:47

## 2020-12-15 RX ADMIN — RIVAROXABAN 10 MILLIGRAM(S): KIT at 17:16

## 2020-12-15 RX ADMIN — Medication 5 MILLIGRAM(S): at 02:25

## 2020-12-15 RX ADMIN — TRAMADOL HYDROCHLORIDE 100 MILLIGRAM(S): 50 TABLET ORAL at 03:30

## 2020-12-15 RX ADMIN — Medication 500 MILLIGRAM(S): at 17:16

## 2020-12-15 RX ADMIN — Medication 1 MILLIGRAM(S): at 13:21

## 2020-12-15 RX ADMIN — Medication 100 MILLIGRAM(S): at 05:47

## 2020-12-15 RX ADMIN — Medication 325 MILLIGRAM(S): at 13:20

## 2020-12-15 RX ADMIN — Medication 500 MILLIGRAM(S): at 05:47

## 2020-12-15 NOTE — PROGRESS NOTE ADULT - SUBJECTIVE AND OBJECTIVE BOX
Orthopaedic PA    Procedure: s/p R TKR  Surgeon: William    60y Male comfortable, c/o some knee pain this AM. Tolerating diet. Denies chest pain, shortness of breath, palpitations, nausea, vomiting, dizziness, fevers, chills    PE:  Vital Signs Last 24 Hrs  T(C): 36.7 (15 Dec 2020 05:30), Max: 37.7 (14 Dec 2020 08:13)  T(F): 98.1 (15 Dec 2020 05:30), Max: 99.9 (14 Dec 2020 08:13)  HR: 90 (15 Dec 2020 05:30) (61 - 109)  BP: 159/83 (15 Dec 2020 05:30) (111/61 - 159/83)  BP(mean): --  RR: 18 (15 Dec 2020 05:30) (12 - 20)  SpO2: 96% (15 Dec 2020 05:30) (93% - 100%)    General:  A + O x 3 in NAD    Knee: R knee Aquacel CDI.   NSLT L2-S1.   5/5 DF/PF/EHL/FHL  2+ DP/2+ PT  No calf tenderness to palpation B/L.      LABS: 12/15 AM labs pending     A/P: 60y Male stable POD#1 s/p R TKR, tolerating diet     - Pain control, supportive care  - Incentive spirometer  - DVT ppx: SCD / Chemical with Xarelto to begin today pending AM H/H   - Ambulation as tolerated  - PT, OT  - F/U AM Labs  - Discharge planning to home with home PT

## 2020-12-15 NOTE — DISCHARGE NOTE NURSING/CASE MANAGEMENT/SOCIAL WORK - PATIENT PORTAL LINK FT
You can access the FollowMyHealth Patient Portal offered by [facility] by registering at the following website: [email or web address]. By joining [employer]’s FollowMyHealth portal, you will also be able to view your health information using other applications (apps) compatible with our system.

## 2020-12-15 NOTE — PROGRESS NOTE ADULT - PROBLEM SELECTOR PLAN 1
hx ha after left knee replacement as well- patient will hold off all nsaids, and will dc losartan  advised follow up with PCP early next week for repeat bmp

## 2020-12-15 NOTE — ANESTHESIA FOLLOW-UP NOTE - NSEVALATIONFT_GEN_ALL_CORE
Patient not seen due to high COVID transmission risk. Patient's chart reviewed. There are no obvious anesthesia-related complications. Vital Signs are stable.

## 2020-12-15 NOTE — OCCUPATIONAL THERAPY INITIAL EVALUATION ADULT - ADDITIONAL COMMENTS
Patient lives with his spouse in a 2 level house with 1 step to enter via garage. Patient reports that he will stay on main level of house with 1/2 bath available. 13 steps with handrail to access master bedroom and bathtub. Patient has raised toilet seat with grab bars in bathroom. Spouse will be available to assist pt at home. Patient owns a cane, rolling walker and raised toilet seat. Patient performs ADL's and transfers/ambulation using rolling walker at a supervision/modified independent level. Patient performed functional ambulation in hospital room and in hallway using rolling walker at a supervision/modified independent level.

## 2020-12-15 NOTE — PROGRESS NOTE ADULT - SUBJECTIVE AND OBJECTIVE BOX
Patient is a 60y old  Male who presents with a chief complaint of Right knee OA/TKA (14 Dec 2020 20:38)  feels well      INTERVAL HPI/OVERNIGHT EVENTS:  T(C): 36.7 (12-15-20 @ 05:30), Max: 37.6 (12-14-20 @ 22:06)  HR: 90 (12-15-20 @ 05:30) (61 - 109)  BP: 159/83 (12-15-20 @ 05:30) (111/61 - 159/83)  RR: 18 (12-15-20 @ 05:30) (12 - 20)  SpO2: 96% (12-15-20 @ 05:30) (93% - 100%)  Wt(kg): --  I&O's Summary    14 Dec 2020 07:01  -  15 Dec 2020 07:00  --------------------------------------------------------  IN: 905 mL / OUT: 275 mL / NET: 630 mL        LABS:                        11.0   16.62 )-----------( 360      ( 15 Dec 2020 06:05 )             33.5     12-15    142  |  107  |  29<H>  ----------------------------<  133<H>  5.0   |  28  |  1.70<H>    Ca    9.1      15 Dec 2020 06:05          CAPILLARY BLOOD GLUCOSE      POCT Blood Glucose.: 121 mg/dL (14 Dec 2020 14:08)            MEDICATIONS  (STANDING):  amLODIPine   Tablet 5 milliGRAM(s) Oral daily  ascorbic acid 500 milliGRAM(s) Oral two times a day  aspirin enteric coated 325 milliGRAM(s) Oral daily  ferrous    sulfate 325 milliGRAM(s) Oral two times a day  folic acid 1 milliGRAM(s) Oral daily  rivaroxaban 10 milliGRAM(s) Oral daily  sodium chloride 0.9%. 1000 milliLiter(s) (75 mL/Hr) IV Continuous <Continuous>    MEDICATIONS  (PRN):  acetaminophen   Tablet .. 650 milliGRAM(s) Oral every 6 hours PRN Temp greater or equal to 38C (100.4F), Mild Pain (1 - 3)  aluminum hydroxide/magnesium hydroxide/simethicone Suspension 30 milliLiter(s) Oral four times a day PRN Indigestion  magnesium hydroxide Suspension 30 milliLiter(s) Oral daily PRN Constipation  ondansetron Injectable 4 milliGRAM(s) IV Push every 6 hours PRN Nausea and/or Vomiting  senna 2 Tablet(s) Oral at bedtime PRN Constipation  traMADol 50 milliGRAM(s) Oral every 4 hours PRN Moderate Pain (4 - 6)  traMADol 100 milliGRAM(s) Oral every 8 hours PRN Severe Pain (7 - 10)      REVIEW OF SYSTEMS:  CONSTITUTIONAL: No fever, weight loss, or fatigue  EYES: No eye pain, visual disturbances, or discharge  ENMT:  No difficulty hearing, tinnitus, vertigo; No sinus or throat pain  NECK: No pain or stiffness  RESPIRATORY: No cough, wheezing, chills or hemoptysis; No shortness of breath  CARDIOVASCULAR: No chest pain, palpitations, dizziness, or leg swelling  GASTROINTESTINAL: No abdominal or epigastric pain. No nausea, vomiting, or hematemesis; No diarrhea or constipation. No melena or hematochezia.  GENITOURINARY: No dysuria, frequency, hematuria, or incontinence  NEUROLOGICAL: No headaches, memory loss, loss of strength, numbness, or tremors  SKIN: No itching, burning, rashes, or lesions   LYMPH NODES: No enlarged glands  ENDOCRINE: No heat or cold intolerance; No hair loss  MUSCULOSKELETAL: chronic r knee pain  PSYCHIATRIC: No depression, anxiety, mood swings, or difficulty sleeping  HEME/LYMPH: No easy bruising, or bleeding gums  ALLERY AND IMMUNOLOGIC: No hives or eczema    RADIOLOGY & ADDITIONAL TESTS:    Imaging Personally Reviewed:  [ ] YES  [ ] NO    Consultant(s) Notes Reviewed:  [ ] YES  [ ] NO    PHYSICAL EXAM:  GENERAL: NAD, well-groomed, well-developed  HEAD:  Atraumatic, Normocephalic  EYES: EOMI, PERRLA, conjunctiva and sclera clear  ENMT: No tonsillar erythema, exudates, or enlargement; Moist mucous membranes, Good dentition, No lesions  NECK: Supple, No JVD, Normal thyroid  NERVOUS SYSTEM:  Alert & Oriented X3, Good concentration; Motor Strength 5/5 B/L upper and lower extremities; DTRs 2+ intact and symmetric  CHEST/LUNG: Clear to percussion bilaterally; No rales, rhonchi, wheezing, or rubs  HEART: Regular rate and rhythm; No murmurs, rubs, or gallops  ABDOMEN: Soft, Nontender, Nondistended; Bowel sounds present  EXTREMITIES:  2+ Peripheral Pulses, No clubbing, cyanosis, or edema  LYMPH: No lymphadenopathy noted  SKIN: No rashes or lesions    Care Discussed with Consultants/Other Providers [ ] YES  [ ] NO

## 2020-12-16 LAB — SURGICAL PATHOLOGY STUDY: SIGNIFICANT CHANGE UP

## 2023-04-11 ENCOUNTER — APPOINTMENT (OUTPATIENT)
Dept: INTERNAL MEDICINE | Facility: CLINIC | Age: 63
End: 2023-04-11

## 2023-06-30 ENCOUNTER — APPOINTMENT (OUTPATIENT)
Dept: INTERNAL MEDICINE | Facility: CLINIC | Age: 63
End: 2023-06-30
Payer: COMMERCIAL

## 2023-06-30 ENCOUNTER — NON-APPOINTMENT (OUTPATIENT)
Age: 63
End: 2023-06-30

## 2023-06-30 VITALS
DIASTOLIC BLOOD PRESSURE: 82 MMHG | BODY MASS INDEX: 36.22 KG/M2 | TEMPERATURE: 98.9 F | OXYGEN SATURATION: 98 % | WEIGHT: 239 LBS | RESPIRATION RATE: 16 BRPM | SYSTOLIC BLOOD PRESSURE: 142 MMHG | HEART RATE: 66 BPM | HEIGHT: 68 IN

## 2023-06-30 DIAGNOSIS — Z78.9 OTHER SPECIFIED HEALTH STATUS: ICD-10-CM

## 2023-06-30 DIAGNOSIS — Z87.39 PERSONAL HISTORY OF OTHER DISEASES OF THE MUSCULOSKELETAL SYSTEM AND CONNECTIVE TISSUE: ICD-10-CM

## 2023-06-30 DIAGNOSIS — I10 ESSENTIAL (PRIMARY) HYPERTENSION: ICD-10-CM

## 2023-06-30 DIAGNOSIS — Z00.00 ENCOUNTER FOR GENERAL ADULT MEDICAL EXAMINATION W/OUT ABNORMAL FINDINGS: ICD-10-CM

## 2023-06-30 PROCEDURE — 93000 ELECTROCARDIOGRAM COMPLETE: CPT | Mod: 59

## 2023-06-30 PROCEDURE — 99386 PREV VISIT NEW AGE 40-64: CPT | Mod: 25

## 2023-06-30 RX ORDER — TRAMADOL HYDROCHLORIDE 50 MG/1
50 TABLET, COATED ORAL
Refills: 0 | Status: ACTIVE | COMMUNITY

## 2023-06-30 RX ORDER — GABAPENTIN 800 MG/1
800 TABLET, COATED ORAL
Refills: 0 | Status: ACTIVE | COMMUNITY

## 2023-06-30 RX ORDER — HYDROCODONE BITARTRATE AND ACETAMINOPHEN 5; 325 MG/1; MG/1
5-325 TABLET ORAL
Refills: 0 | Status: ACTIVE | COMMUNITY

## 2023-06-30 NOTE — ASSESSMENT
[FreeTextEntry1] : HTN- restart losartan 50mg daily\par check ekg\par HCM- needs colon\par follow up with urology

## 2023-06-30 NOTE — HEALTH RISK ASSESSMENT
[Good] : ~his/her~  mood as  good [No falls in past year] : Patient reported no falls in the past year [0] : 2) Feeling down, depressed, or hopeless: Not at all (0) [PHQ-2 Negative - No further assessment needed] : PHQ-2 Negative - No further assessment needed [[id number]] : 0 [Patient reported colonoscopy was normal] : Patient reported colonoscopy was normal [ColonoscopyDate] : 2013

## 2023-08-07 ENCOUNTER — APPOINTMENT (OUTPATIENT)
Dept: SURGICAL ONCOLOGY | Facility: CLINIC | Age: 63
End: 2023-08-07
Payer: COMMERCIAL

## 2023-08-07 VITALS
OXYGEN SATURATION: 98 % | WEIGHT: 245 LBS | HEIGHT: 68 IN | SYSTOLIC BLOOD PRESSURE: 165 MMHG | HEART RATE: 60 BPM | BODY MASS INDEX: 37.13 KG/M2 | RESPIRATION RATE: 17 BRPM | DIASTOLIC BLOOD PRESSURE: 96 MMHG

## 2023-08-07 PROCEDURE — 99204 OFFICE O/P NEW MOD 45 MIN: CPT

## 2023-08-07 RX ORDER — HYDROCODONE BITARTRATE AND ACETAMINOPHEN 7.5; 3 MG/1; MG/1
7.5-3 TABLET ORAL
Qty: 120 | Refills: 0 | Status: ACTIVE | COMMUNITY
Start: 2023-07-10

## 2023-08-07 RX ORDER — TAMSULOSIN HYDROCHLORIDE 0.4 MG/1
0.4 CAPSULE ORAL
Qty: 21 | Refills: 0 | Status: ACTIVE | COMMUNITY
Start: 2023-03-30

## 2023-08-07 RX ORDER — SOLIFENACIN SUCCINATE 5 MG/1
5 TABLET ORAL
Qty: 21 | Refills: 0 | Status: ACTIVE | COMMUNITY
Start: 2023-07-24

## 2023-08-08 NOTE — REASON FOR VISIT
[Initial Consultation] : an initial consultation for [Other: _____] : [unfilled] [FreeTextEntry2] : Mesenteric, retroperitoneal, and left external iliac adenopathy

## 2023-08-08 NOTE — ASSESSMENT
[FreeTextEntry1] : 63-year-old gentleman without any personal history of malignancy, or specific/constitutional signs or symptoms, who has been referred for the evaluation and management of mesenteric, retroperitoneal, and left pelvic adenopathy identified initially on an MRI done to follow-up chronic low back pain. Further assessed with CT scan of the abdomen and pelvis.  No other masses are palpable.  I have submitted a prescription for him to have a PET scan to help guide approach for tissue diagnosis to establish histology. Those results will guide further management.  Reviewed in detail, all questions answered.  Referring physician contacted through secure email.

## 2023-08-08 NOTE — HISTORY OF PRESENT ILLNESS
[de-identified] : 63-year-old man.  Referred by his internist: Dr. Nathalie SANTOYO.  CC: Retroperitoneal adenopathy.  The retroperitoneal lymph nodes of concern were an asymptomatic nonpalpable findings noted incidentally on an MRI of the back performed July 5, 2023 @[facility]. These represented an interval change from an MRI performed in 2015. Study was requested by Dr. Kuldeep WALLACE (pain management).  7/17/23: CT abdomen and pelvis @[facility]: 6.2 x 2.4 x 2.6 cm mesenteric adenopathy to right of midline. 2.8 x 1.7 x 2.0 cm left external iliac adenopathy. + Retroperitoneal adenopathy.  Patient has been referred for further management.   CC: Asymptomatic.  No prior personal history of malignancy.  No recent weight loss/gain. No constitutional symptoms.   + Family history of malignancy: Mother: Breast cancer at 70. No other relatives with a history of malignancy   PMD: Dr. Nathalie SANTOYO.  No pacemaker or defibrillator. No anticoagulants.  NKDA.  + Hypertension. He takes losartan. He does not see a cardiologist.  + Chronic back pain. Sees pain management: Dr. Kuldeep WALLACE He takes gabapentin, hydrocodone/acetaminophen, and tramadol.  + Osteoarthritis. 2020: Bilateral total knee replacements at [facility]   2013 colonoscopy was normal. No details are available.

## 2023-08-08 NOTE — REVIEW OF SYSTEMS
[Negative] : Endocrine [FreeTextEntry5] : Hypertension [de-identified] : Osteoarthritis [de-identified] : Sciatica [FreeTextEntry1] : Adenopathy

## 2023-08-23 ENCOUNTER — APPOINTMENT (OUTPATIENT)
Dept: NUCLEAR MEDICINE | Facility: CLINIC | Age: 63
End: 2023-08-23
Payer: COMMERCIAL

## 2023-08-23 ENCOUNTER — OUTPATIENT (OUTPATIENT)
Dept: OUTPATIENT SERVICES | Facility: HOSPITAL | Age: 63
LOS: 1 days | End: 2023-08-23
Payer: COMMERCIAL

## 2023-08-23 DIAGNOSIS — R59.9 ENLARGED LYMPH NODES, UNSPECIFIED: ICD-10-CM

## 2023-08-23 PROCEDURE — 78816 PET IMAGE W/CT FULL BODY: CPT | Mod: 26,PI

## 2023-08-23 PROCEDURE — 78816 PET IMAGE W/CT FULL BODY: CPT

## 2023-08-23 PROCEDURE — A9552: CPT

## 2023-08-27 NOTE — PHYSICAL EXAM
Lili is calling for the H & P  and other testing to be faxed to [phone] when it's ready   [Normal] : supple, no neck mass and thyroid not enlarged [Normal Neck Lymph Nodes] : normal neck lymph nodes  [Normal Supraclavicular Lymph Nodes] : normal supraclavicular lymph nodes [Normal Groin Lymph Nodes] : normal groin lymph nodes [Normal Axillary Lymph Nodes] : normal axillary lymph nodes [Normal] : normal appearance, no rash, nodules, vesicles, ulcers, erythema

## 2023-08-28 ENCOUNTER — APPOINTMENT (OUTPATIENT)
Dept: SURGICAL ONCOLOGY | Facility: CLINIC | Age: 63
End: 2023-08-28
Payer: COMMERCIAL

## 2023-08-28 VITALS
OXYGEN SATURATION: 99 % | WEIGHT: 235 LBS | SYSTOLIC BLOOD PRESSURE: 180 MMHG | DIASTOLIC BLOOD PRESSURE: 95 MMHG | BODY MASS INDEX: 35.61 KG/M2 | HEIGHT: 68 IN | HEART RATE: 58 BPM | RESPIRATION RATE: 17 BRPM

## 2023-08-28 PROCEDURE — 99215 OFFICE O/P EST HI 40 MIN: CPT

## 2023-09-15 ENCOUNTER — RX RENEWAL (OUTPATIENT)
Age: 63
End: 2023-09-15

## 2023-09-20 ENCOUNTER — APPOINTMENT (OUTPATIENT)
Dept: INTERVENTIONAL RADIOLOGY/VASCULAR | Facility: CLINIC | Age: 63
End: 2023-09-20
Payer: COMMERCIAL

## 2023-09-20 VITALS — WEIGHT: 240 LBS | HEIGHT: 69 IN | BODY MASS INDEX: 35.55 KG/M2

## 2023-09-20 DIAGNOSIS — R59.0 LOCALIZED ENLARGED LYMPH NODES: ICD-10-CM

## 2023-09-20 DIAGNOSIS — R59.9 ENLARGED LYMPH NODES, UNSPECIFIED: ICD-10-CM

## 2023-09-20 DIAGNOSIS — Z80.3 FAMILY HISTORY OF MALIGNANT NEOPLASM OF BREAST: ICD-10-CM

## 2023-09-20 PROCEDURE — 99214 OFFICE O/P EST MOD 30 MIN: CPT | Mod: 95

## 2023-09-27 ENCOUNTER — OUTPATIENT (OUTPATIENT)
Dept: OUTPATIENT SERVICES | Facility: HOSPITAL | Age: 63
LOS: 1 days | End: 2023-09-27
Payer: COMMERCIAL

## 2023-09-27 ENCOUNTER — RESULT REVIEW (OUTPATIENT)
Age: 63
End: 2023-09-27

## 2023-09-27 DIAGNOSIS — R59.0 LOCALIZED ENLARGED LYMPH NODES: ICD-10-CM

## 2023-09-27 PROCEDURE — 88305 TISSUE EXAM BY PATHOLOGIST: CPT | Mod: 26

## 2023-09-27 PROCEDURE — 88342 IMHCHEM/IMCYTCHM 1ST ANTB: CPT | Mod: 26,59

## 2023-09-27 PROCEDURE — 88189 FLOWCYTOMETRY/READ 16 & >: CPT

## 2023-09-27 PROCEDURE — 88360 TUMOR IMMUNOHISTOCHEM/MANUAL: CPT | Mod: 26

## 2023-09-27 PROCEDURE — 77012 CT SCAN FOR NEEDLE BIOPSY: CPT | Mod: 26

## 2023-09-27 PROCEDURE — 88173 CYTOPATH EVAL FNA REPORT: CPT | Mod: 26

## 2023-09-27 PROCEDURE — 38505 NEEDLE BIOPSY LYMPH NODES: CPT

## 2023-09-27 PROCEDURE — 88341 IMHCHEM/IMCYTCHM EA ADD ANTB: CPT | Mod: 26

## 2023-09-27 RX ORDER — ASPIRIN/CALCIUM CARB/MAGNESIUM 324 MG
0 TABLET ORAL
Qty: 0 | Refills: 0 | DISCHARGE

## 2023-09-27 NOTE — PROCEDURE NOTE - PROCEDURE FINDINGS AND DETAILS
Successful core needle and fine needle biopsy of left external iliac lymph node with tract embolization using Avitin and D-stat.

## 2023-10-02 LAB — TM INTERPRETATION: SIGNIFICANT CHANGE UP

## 2023-10-04 LAB — NON-GYNECOLOGICAL CYTOLOGY STUDY: SIGNIFICANT CHANGE UP

## 2023-10-08 LAB
ANION GAP SERPL CALC-SCNC: 11 MMOL/L
BUN SERPL-MCNC: 30 MG/DL
CALCIUM SERPL-MCNC: 9.6 MG/DL
CHLORIDE SERPL-SCNC: 107 MMOL/L
CO2 SERPL-SCNC: 27 MMOL/L
CREAT SERPL-MCNC: 1.33 MG/DL
EGFR: 61 ML/MIN/1.73M2
GLUCOSE SERPL-MCNC: 69 MG/DL
HCT VFR BLD CALC: 44.5 %
HGB BLD-MCNC: 14.3 G/DL
MCHC RBC-ENTMCNC: 29.2 PG
MCHC RBC-ENTMCNC: 32.1 GM/DL
MCV RBC AUTO: 91 FL
PLATELET # BLD AUTO: 247 K/UL
POTASSIUM SERPL-SCNC: 4.2 MMOL/L
RBC # BLD: 4.89 M/UL
RBC # FLD: 15.7 %
SODIUM SERPL-SCNC: 146 MMOL/L
WBC # FLD AUTO: 8.83 K/UL

## 2023-10-20 ENCOUNTER — APPOINTMENT (OUTPATIENT)
Dept: INTERNAL MEDICINE | Facility: CLINIC | Age: 63
End: 2023-10-20

## 2023-11-01 ENCOUNTER — RESULT REVIEW (OUTPATIENT)
Age: 63
End: 2023-11-01

## 2023-11-06 ENCOUNTER — APPOINTMENT (OUTPATIENT)
Dept: CT IMAGING | Facility: CLINIC | Age: 63
End: 2023-11-06
Payer: COMMERCIAL

## 2023-11-06 ENCOUNTER — OUTPATIENT (OUTPATIENT)
Dept: OUTPATIENT SERVICES | Facility: HOSPITAL | Age: 63
LOS: 1 days | End: 2023-11-06
Payer: COMMERCIAL

## 2023-11-06 DIAGNOSIS — R59.0 LOCALIZED ENLARGED LYMPH NODES: ICD-10-CM

## 2023-11-06 PROCEDURE — 74177 CT ABD & PELVIS W/CONTRAST: CPT | Mod: 26

## 2023-11-06 PROCEDURE — 74177 CT ABD & PELVIS W/CONTRAST: CPT

## 2023-12-06 NOTE — H&P PST ADULT - PRO ANTICIPATED DISCH DISP
Okay continue the amlodipine 10 mg daily and her losartan and start her back on hydrochlorothiazide 25 mg 1 tablet daily.  Please send 30 tablets to her preferred pharmacy.   home

## 2024-01-26 ENCOUNTER — RESULT REVIEW (OUTPATIENT)
Age: 64
End: 2024-01-26

## 2024-01-26 ENCOUNTER — OUTPATIENT (OUTPATIENT)
Dept: OUTPATIENT SERVICES | Facility: HOSPITAL | Age: 64
LOS: 1 days | End: 2024-01-26
Payer: COMMERCIAL

## 2024-01-26 DIAGNOSIS — R59.9 ENLARGED LYMPH NODES, UNSPECIFIED: ICD-10-CM

## 2024-01-26 DIAGNOSIS — R59.0 LOCALIZED ENLARGED LYMPH NODES: ICD-10-CM

## 2024-01-26 LAB
ANION GAP SERPL CALC-SCNC: 11 MMOL/L — SIGNIFICANT CHANGE UP (ref 7–14)
APTT BLD: 28.7 SEC — SIGNIFICANT CHANGE UP (ref 24.5–35.6)
BASOPHILS # BLD AUTO: 0.04 K/UL — SIGNIFICANT CHANGE UP (ref 0–0.2)
BASOPHILS NFR BLD AUTO: 0.5 % — SIGNIFICANT CHANGE UP (ref 0–2)
BUN SERPL-MCNC: 32 MG/DL — HIGH (ref 7–23)
CALCIUM SERPL-MCNC: 8.9 MG/DL — SIGNIFICANT CHANGE UP (ref 8.4–10.5)
CHLORIDE SERPL-SCNC: 106 MMOL/L — SIGNIFICANT CHANGE UP (ref 98–107)
CO2 SERPL-SCNC: 24 MMOL/L — SIGNIFICANT CHANGE UP (ref 22–31)
CREAT SERPL-MCNC: 1.22 MG/DL — SIGNIFICANT CHANGE UP (ref 0.5–1.3)
EGFR: 67 ML/MIN/1.73M2 — SIGNIFICANT CHANGE UP
EOSINOPHIL # BLD AUTO: 0.23 K/UL — SIGNIFICANT CHANGE UP (ref 0–0.5)
EOSINOPHIL NFR BLD AUTO: 2.9 % — SIGNIFICANT CHANGE UP (ref 0–6)
GLUCOSE SERPL-MCNC: 97 MG/DL — SIGNIFICANT CHANGE UP (ref 70–99)
HCT VFR BLD CALC: 43.8 % — SIGNIFICANT CHANGE UP (ref 39–50)
HGB BLD-MCNC: 14.5 G/DL — SIGNIFICANT CHANGE UP (ref 13–17)
IANC: 3.88 K/UL — SIGNIFICANT CHANGE UP (ref 1.8–7.4)
IMM GRANULOCYTES NFR BLD AUTO: 0.4 % — SIGNIFICANT CHANGE UP (ref 0–0.9)
INR BLD: 0.9 RATIO — SIGNIFICANT CHANGE UP (ref 0.85–1.18)
LYMPHOCYTES # BLD AUTO: 2.79 K/UL — SIGNIFICANT CHANGE UP (ref 1–3.3)
LYMPHOCYTES # BLD AUTO: 35.6 % — SIGNIFICANT CHANGE UP (ref 13–44)
MCHC RBC-ENTMCNC: 28.4 PG — SIGNIFICANT CHANGE UP (ref 27–34)
MCHC RBC-ENTMCNC: 33.1 GM/DL — SIGNIFICANT CHANGE UP (ref 32–36)
MCV RBC AUTO: 85.7 FL — SIGNIFICANT CHANGE UP (ref 80–100)
MONOCYTES # BLD AUTO: 0.87 K/UL — SIGNIFICANT CHANGE UP (ref 0–0.9)
MONOCYTES NFR BLD AUTO: 11.1 % — SIGNIFICANT CHANGE UP (ref 2–14)
NEUTROPHILS # BLD AUTO: 3.88 K/UL — SIGNIFICANT CHANGE UP (ref 1.8–7.4)
NEUTROPHILS NFR BLD AUTO: 49.5 % — SIGNIFICANT CHANGE UP (ref 43–77)
NRBC # BLD: 0 /100 WBCS — SIGNIFICANT CHANGE UP (ref 0–0)
NRBC # FLD: 0 K/UL — SIGNIFICANT CHANGE UP (ref 0–0)
PLATELET # BLD AUTO: 267 K/UL — SIGNIFICANT CHANGE UP (ref 150–400)
POTASSIUM SERPL-MCNC: 4.4 MMOL/L — SIGNIFICANT CHANGE UP (ref 3.5–5.3)
POTASSIUM SERPL-SCNC: 4.4 MMOL/L — SIGNIFICANT CHANGE UP (ref 3.5–5.3)
PROTHROM AB SERPL-ACNC: 10.1 SEC — SIGNIFICANT CHANGE UP (ref 9.5–13)
RBC # BLD: 5.11 M/UL — SIGNIFICANT CHANGE UP (ref 4.2–5.8)
RBC # FLD: 15 % — HIGH (ref 10.3–14.5)
SODIUM SERPL-SCNC: 141 MMOL/L — SIGNIFICANT CHANGE UP (ref 135–145)
WBC # BLD: 7.84 K/UL — SIGNIFICANT CHANGE UP (ref 3.8–10.5)
WBC # FLD AUTO: 7.84 K/UL — SIGNIFICANT CHANGE UP (ref 3.8–10.5)

## 2024-01-26 PROCEDURE — 88172 CYTP DX EVAL FNA 1ST EA SITE: CPT | Mod: 26

## 2024-01-26 PROCEDURE — 88360 TUMOR IMMUNOHISTOCHEM/MANUAL: CPT | Mod: 26,59

## 2024-01-26 PROCEDURE — 88173 CYTOPATH EVAL FNA REPORT: CPT | Mod: 26

## 2024-01-26 PROCEDURE — 88189 FLOWCYTOMETRY/READ 16 & >: CPT

## 2024-01-26 PROCEDURE — 88341 IMHCHEM/IMCYTCHM EA ADD ANTB: CPT | Mod: 26

## 2024-01-26 PROCEDURE — 88307 TISSUE EXAM BY PATHOLOGIST: CPT | Mod: 26

## 2024-01-26 PROCEDURE — 88342 IMHCHEM/IMCYTCHM 1ST ANTB: CPT | Mod: 26

## 2024-01-26 PROCEDURE — 77012 CT SCAN FOR NEEDLE BIOPSY: CPT | Mod: 26

## 2024-01-26 PROCEDURE — 49180 BIOPSY ABDOMINAL MASS: CPT

## 2024-01-26 RX ORDER — LOSARTAN POTASSIUM 100 MG/1
0 TABLET, FILM COATED ORAL
Qty: 0 | Refills: 0 | DISCHARGE

## 2024-01-26 RX ORDER — GABAPENTIN 400 MG/1
1 CAPSULE ORAL
Refills: 0 | DISCHARGE

## 2024-01-26 RX ORDER — TRAMADOL HYDROCHLORIDE 50 MG/1
1 TABLET ORAL
Refills: 0 | DISCHARGE

## 2024-01-26 RX ORDER — TAMSULOSIN HYDROCHLORIDE 0.4 MG/1
1 CAPSULE ORAL
Refills: 0 | DISCHARGE

## 2024-01-26 NOTE — PRE PROCEDURE NOTE - HISTORY OF PRESENT ILLNESS
Interventional Radiology  Pre-Procedure Note    This is a 63y  Male  presenting for lymph node biopsy    HPI:  63 years old male with hx of HTN, back pain. Patient had MR of back in July at  with incidental finding of pelvic lymphadenopathy. Patient had Ct abd done which demonstrated, "6.2 x 2.4 x 2.6 cm mesenteric adenopathy to right of midline.2.8 x 1.7 x 2.0 cm left external iliac adenopathy.+ Retroperitoneal adenopathy."    Patient was seen by Dr. Gorman. Patient had PET/Ct scan done which demonstrated, "FDG avid nodes below the diaphragm suspicious for malignant process. Right axillary node with increased activity is indeterminate due to ipsilateral radiopharmaceutical administration. Stable nonavid left adrenal nodule compatible with identified adenoma."    Patient is now being referred for for lymph node bx.     PAST MEDICAL & SURGICAL HISTORY:  Knee injury      Low back pain      Unilateral primary osteoarthritis, right knee      Hypertension      H/O knee surgery  left replacement 10/28/20      H/O hammer toe correction  2020          Social History:     FAMILY HISTORY:  FH: diabetes mellitus  mother father both         Allergies: No Known Allergies      Current Medications:     Labs:                         14.5   7.84  )-----------( 267      ( 2024 11:15 )             43.8       01-    141  |  106  |  32<H>  ----------------------------<  97  4.4   |  24  |  1.22    Ca    8.9      2024 11:15        Radiology:   < from: NM PET/CT Oncology FDG WB, Inital (23 @ 17:00) >    IMPRESSION:  1. FDG avid nodes below the diaphragm suspicious for malignant process.   Right axillary node with increased activity is indeterminate due to   ipsilateral radiopharmaceutical administration.    2. Stable nonavid left adrenal nodule compatible with identified adenoma.    --- End of Report ---                 < end of copied text >      Assessment/Plan:   This is a 63y Male  presents with RP and pelvic nodes  Patient presents to IR for CT guided biopsy. PLan for CT biopsy of RP node, however scan before sedation. If node not amenable, then can plan for pelvic node Bx  Procedure/ risks/ benefits/ goals/ alternatives were explained. All questions answered. Informed content obtained from patient. Consent placed in chart.

## 2024-01-29 LAB — TM INTERPRETATION: SIGNIFICANT CHANGE UP

## 2024-02-06 LAB — NON-GYNECOLOGICAL CYTOLOGY STUDY: SIGNIFICANT CHANGE UP

## 2024-03-12 NOTE — ASSESSMENT
[FreeTextEntry1] : 63-year-old gentleman without any personal history of malignancy, or specific/constitutional signs or symptoms, who has been referred for the evaluation and management of mesenteric, retroperitoneal, and left pelvic adenopathy identified initially on an MRI done to follow-up chronic low back pain. Further assessed with CT scan of the abdomen and pelvis.  No other masses are palpable.  PET scan identifies a 4.5 cm mass to the right of midline intra-abdominally, accessible to percutaneous CT-guided biopsy. Procedure explained. Prescription entered to have performed in interventional radiology at [facility]. Contacted radiology through secure email.  Reviewed in detail, all questions answered.   I have asked him to call me a week after the procedure to discuss the results.   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  10/20/2023: I contacted interventional radiology through secure email to obtain their recommendations for follow-up/further management. I await their reply.   10/27/2023. I spoke to his wife (Azra). September 27, 2023, he had a CT-guided needle biopsy of right iliac adenopathy at [facility]. Pathology demonstrates interfollicular hyperplasia.  I have communicated with interventional radiology and their recommendation is to repeat a CT scan of the abdomen and pelvis presently.  If the retroperitoneal adenopathy still persists, they plan to approach that area with a separate image guided biopsy.  She understands and agrees.  Prescription for diagnostic imaging entered today.   11/6/2023: CT abdomen and pelvis at [place]: Unchanged retroperitoneal, mesenteric, and iliac adenopathy.  On his August 2023 PET scan, the subdiaphragmatic lymph nodes were suspicious for a malignant process.  11/20/2023: I returned the patient's call. I reviewed the above CT results.  I emailed interventional radiology to let them know about the results and inquire about the timing of his next image guided tissue sampling.   12/30/2023. Reviewed his chart. No record that a repeat biopsy has been scheduled yet, the retroperitoneal/iliac adenopathy. Interventional radiology contacted through secure email, again.   1/26/2024: CT-guided biopsy of the mesenteric mass demonstrates an atypical lymphoid proliferation. Flow cytometry suggests a CD4+ CD8+ T-cell subset.

## 2024-03-12 NOTE — REVIEW OF SYSTEMS
[Negative] : Endocrine [FreeTextEntry5] : Hypertension [de-identified] : Osteoarthritis [FreeTextEntry1] : Adenopathy [de-identified] : Chronic back pain

## 2024-03-12 NOTE — REASON FOR VISIT
[Follow-Up Visit] : a follow-up visit for [Other: _____] : [unfilled] [FreeTextEntry2] : Abdominal and ventral peritoneal lymphadenopathy

## 2024-03-12 NOTE — HISTORY OF PRESENT ILLNESS
[de-identified] : 63-year-old man.  Scheduled follow-up. Referred earlier this month with retroperitoneal lymphadenopathy identified on back MRI done to evaluate LBP (below).  My examination was normal.  A subsequent PET scan has identified the anatomic region most likely to be diagnostic of the process (below).   8/7/2023: Referred by his internist: Dr. Nathalie SANTOYO.  CC: Retroperitoneal adenopathy.  The retroperitoneal lymph nodes of concern were an asymptomatic nonpalpable findings noted incidentally on an MRI of the back performed July 5, 2023 @[facility]. These represented an interval change from an MRI performed in 2015. Study was requested by Dr. Kuldeep WALLACE (pain management).  7/17/23: CT abdomen and pelvis @[facility]: 6.2 x 2.4 x 2.6 cm mesenteric adenopathy to right of midline. 2.8 x 1.7 x 2.0 cm left external iliac adenopathy. + Retroperitoneal adenopathy.  Patient was referred for further management.  My physical examination identified no worrisome findings.   8/23/2023: PET scan at [place]. 1.  Mesenteric mode conglomerate to the right of midline measuring 4.3 x 2.5 cm, PET avid. Amenable to CT-guided biopsy. 2.  Retrocaval node (1.8 x 1.1 cm, also PET avid, accessible for percutaneous biopsy. 3.  Right external iliac node (3.6 x 1.4 cm) equivocal (reactive versus neoplastic process). 4.  Left external iliac nodes (3.1 x 1.0 cm and 2.5 x 1.5 cm) with low activity.   CC: Asymptomatic.  No prior personal history of malignancy.  No recent weight loss/gain. No constitutional symptoms.   + Family history of malignancy: Mother: Breast cancer at 70. No other relatives with a history of malignancy   PMD: Dr. Nathalie SANTOYO.  No pacemaker or defibrillator. No anticoagulants.  NKDA.  + Hypertension. He takes losartan. He does not see a cardiologist.  + Chronic back pain. Sees pain management: Dr. Kuldeep WALLACE He takes gabapentin, hydrocodone/acetaminophen, and tramadol.  + Osteoarthritis. 2020: Bilateral total knee replacements at [facility]   2013 colonoscopy was normal. No details are available.

## 2024-03-26 ENCOUNTER — RX RENEWAL (OUTPATIENT)
Age: 64
End: 2024-03-26

## 2024-03-26 RX ORDER — LOSARTAN POTASSIUM 50 MG/1
50 TABLET, FILM COATED ORAL DAILY
Qty: 21 | Refills: 4 | Status: ACTIVE | COMMUNITY
Start: 2023-06-30 | End: 1900-01-01

## 2024-05-31 NOTE — ED PROVIDER NOTE - HISTORY ATTESTATION, MLM
38 yr old female with hx of cholecystectomy and c section presents to ed c/o left sided abd pain along with left flank pain [id number]. no dysuria, currently on period. works as CNA. no trauma, no vomiting, + nausea. no fever. I have reviewed and confirmed nurses' notes...

## 2024-09-05 ENCOUNTER — RX RENEWAL (OUTPATIENT)
Age: 64
End: 2024-09-05

## 2025-02-05 ENCOUNTER — RX RENEWAL (OUTPATIENT)
Age: 65
End: 2025-02-05

## 2025-05-08 ENCOUNTER — EMERGENCY (EMERGENCY)
Facility: HOSPITAL | Age: 65
LOS: 1 days | End: 2025-05-08
Attending: STUDENT IN AN ORGANIZED HEALTH CARE EDUCATION/TRAINING PROGRAM | Admitting: STUDENT IN AN ORGANIZED HEALTH CARE EDUCATION/TRAINING PROGRAM
Payer: COMMERCIAL

## 2025-05-08 VITALS
WEIGHT: 235.01 LBS | SYSTOLIC BLOOD PRESSURE: 163 MMHG | HEART RATE: 88 BPM | TEMPERATURE: 98 F | RESPIRATION RATE: 20 BRPM | OXYGEN SATURATION: 97 % | HEIGHT: 69 IN | DIASTOLIC BLOOD PRESSURE: 104 MMHG

## 2025-05-08 VITALS — DIASTOLIC BLOOD PRESSURE: 87 MMHG | SYSTOLIC BLOOD PRESSURE: 169 MMHG

## 2025-05-08 LAB
ALBUMIN SERPL ELPH-MCNC: 4.1 G/DL — SIGNIFICANT CHANGE UP (ref 3.3–5)
ALP SERPL-CCNC: 72 U/L — SIGNIFICANT CHANGE UP (ref 40–120)
ALT FLD-CCNC: 32 U/L — SIGNIFICANT CHANGE UP (ref 12–78)
ANION GAP SERPL CALC-SCNC: 9 MMOL/L — SIGNIFICANT CHANGE UP (ref 5–17)
APTT BLD: 23.2 SEC — LOW (ref 26.1–36.8)
AST SERPL-CCNC: 44 U/L — HIGH (ref 15–37)
BASOPHILS # BLD AUTO: 0.03 K/UL — SIGNIFICANT CHANGE UP (ref 0–0.2)
BASOPHILS NFR BLD AUTO: 0.3 % — SIGNIFICANT CHANGE UP (ref 0–2)
BILIRUB SERPL-MCNC: 0.9 MG/DL — SIGNIFICANT CHANGE UP (ref 0.2–1.2)
BUN SERPL-MCNC: 25 MG/DL — HIGH (ref 7–23)
CALCIUM SERPL-MCNC: 9.3 MG/DL — SIGNIFICANT CHANGE UP (ref 8.5–10.1)
CHLORIDE SERPL-SCNC: 105 MMOL/L — SIGNIFICANT CHANGE UP (ref 96–108)
CO2 SERPL-SCNC: 25 MMOL/L — SIGNIFICANT CHANGE UP (ref 22–31)
CREAT SERPL-MCNC: 1.7 MG/DL — HIGH (ref 0.5–1.3)
EGFR: 45 ML/MIN/1.73M2 — LOW
EGFR: 45 ML/MIN/1.73M2 — LOW
EOSINOPHIL # BLD AUTO: 0.26 K/UL — SIGNIFICANT CHANGE UP (ref 0–0.5)
EOSINOPHIL NFR BLD AUTO: 2.6 % — SIGNIFICANT CHANGE UP (ref 0–6)
GLUCOSE SERPL-MCNC: 58 MG/DL — LOW (ref 70–99)
HCT VFR BLD CALC: 49.4 % — SIGNIFICANT CHANGE UP (ref 39–50)
HGB BLD-MCNC: 16.6 G/DL — SIGNIFICANT CHANGE UP (ref 13–17)
IMM GRANULOCYTES NFR BLD AUTO: 0.4 % — SIGNIFICANT CHANGE UP (ref 0–0.9)
INR BLD: 0.91 RATIO — SIGNIFICANT CHANGE UP (ref 0.85–1.16)
LYMPHOCYTES # BLD AUTO: 2.9 K/UL — SIGNIFICANT CHANGE UP (ref 1–3.3)
LYMPHOCYTES # BLD AUTO: 29.3 % — SIGNIFICANT CHANGE UP (ref 13–44)
MAGNESIUM SERPL-MCNC: 2.3 MG/DL — SIGNIFICANT CHANGE UP (ref 1.6–2.6)
MCHC RBC-ENTMCNC: 28.4 PG — SIGNIFICANT CHANGE UP (ref 27–34)
MCHC RBC-ENTMCNC: 33.6 G/DL — SIGNIFICANT CHANGE UP (ref 32–36)
MCV RBC AUTO: 84.6 FL — SIGNIFICANT CHANGE UP (ref 80–100)
MONOCYTES # BLD AUTO: 1.08 K/UL — HIGH (ref 0–0.9)
MONOCYTES NFR BLD AUTO: 10.9 % — SIGNIFICANT CHANGE UP (ref 2–14)
NEUTROPHILS # BLD AUTO: 5.6 K/UL — SIGNIFICANT CHANGE UP (ref 1.8–7.4)
NEUTROPHILS NFR BLD AUTO: 56.5 % — SIGNIFICANT CHANGE UP (ref 43–77)
NRBC BLD AUTO-RTO: 0 /100 WBCS — SIGNIFICANT CHANGE UP (ref 0–0)
PLATELET # BLD AUTO: 277 K/UL — SIGNIFICANT CHANGE UP (ref 150–400)
POTASSIUM SERPL-MCNC: 4.9 MMOL/L — SIGNIFICANT CHANGE UP (ref 3.5–5.3)
POTASSIUM SERPL-SCNC: 4.9 MMOL/L — SIGNIFICANT CHANGE UP (ref 3.5–5.3)
PROT SERPL-MCNC: 8.7 G/DL — HIGH (ref 6–8.3)
PROTHROM AB SERPL-ACNC: 10.7 SEC — SIGNIFICANT CHANGE UP (ref 9.9–13.4)
RBC # BLD: 5.84 M/UL — HIGH (ref 4.2–5.8)
RBC # FLD: 14.5 % — SIGNIFICANT CHANGE UP (ref 10.3–14.5)
SODIUM SERPL-SCNC: 139 MMOL/L — SIGNIFICANT CHANGE UP (ref 135–145)
WBC # BLD: 9.91 K/UL — SIGNIFICANT CHANGE UP (ref 3.8–10.5)
WBC # FLD AUTO: 9.91 K/UL — SIGNIFICANT CHANGE UP (ref 3.8–10.5)

## 2025-05-08 PROCEDURE — 70498 CT ANGIOGRAPHY NECK: CPT | Mod: 26

## 2025-05-08 PROCEDURE — 70450 CT HEAD/BRAIN W/O DYE: CPT | Mod: 26,59

## 2025-05-08 PROCEDURE — 93010 ELECTROCARDIOGRAM REPORT: CPT

## 2025-05-08 PROCEDURE — 70496 CT ANGIOGRAPHY HEAD: CPT | Mod: 26

## 2025-05-08 PROCEDURE — 99285 EMERGENCY DEPT VISIT HI MDM: CPT

## 2025-05-08 RX ORDER — HYDROXYZINE HYDROCHLORIDE 25 MG/1
1 TABLET, FILM COATED ORAL
Qty: 30 | Refills: 0
Start: 2025-05-08

## 2025-05-08 RX ORDER — ACETAMINOPHEN 500 MG/5ML
1000 LIQUID (ML) ORAL ONCE
Refills: 0 | Status: COMPLETED | OUTPATIENT
Start: 2025-05-08 | End: 2025-05-08

## 2025-05-08 RX ORDER — ASPIRIN 325 MG
324 TABLET ORAL ONCE
Refills: 0 | Status: COMPLETED | OUTPATIENT
Start: 2025-05-08 | End: 2025-05-08

## 2025-05-08 RX ORDER — METOCLOPRAMIDE HCL 10 MG
10 TABLET ORAL ONCE
Refills: 0 | Status: COMPLETED | OUTPATIENT
Start: 2025-05-08 | End: 2025-05-08

## 2025-05-08 RX ORDER — AMLODIPINE BESYLATE 10 MG/1
1 TABLET ORAL
Qty: 14 | Refills: 0
Start: 2025-05-08

## 2025-05-08 RX ADMIN — Medication 400 MILLIGRAM(S): at 20:41

## 2025-05-08 RX ADMIN — Medication 10 MILLIGRAM(S): at 20:43

## 2025-05-08 RX ADMIN — Medication 1000 MILLILITER(S): at 20:41

## 2025-05-08 NOTE — ED PROVIDER NOTE - PROGRESS NOTE DETAILS
Patient was re-evaluated at this time and they are feeling much better. his imaging shows no acute findings, no evidence of CVA or stenosis or occlusion. I offered patient to be admitted for further work up and MRI, however patient does not wish to stay he wants to go home and follow up outpatient. reports that his biggest concern is anxiety and his elevated BP. states he takes losartan but still elevated and could not get in to see his PCP until september. he has a neurologist that he used to see that he will try and make an appointment, will send short course of medications for BP and anxiety in the meantime. currently NIHSS remains 0 he is stable without acute distress. The Patient will be discharged home at this time. Their lab and/or imaging results were explained with the patient and they were instructed to go over them with their PCP. All questions were answered at this time.     Patient was told to follow up with their PCP within the next 2 days. they were told to return to the ED if they have numbness, weakness, worsening unsteadiness    patient will be discharged home at this time, they are in agreement with the plan Patient was re-evaluated at this time and they are feeling much better. his imaging shows no acute findings, no evidence of CVA or stenosis or occlusion. I offered patient to be admitted for further work up and MRI, however patient does not wish to stay he wants to go home and follow up outpatient. reports that his biggest concern is anxiety and his elevated BP. states he takes losartan but still elevated and could not get in to see his PCP until september. he has a neurologist that he used to see that he will try and make an appointment, will send short course of medications for BP and anxiety in the meantime. currently NIHSS remains 0 he is stable without acute distress. The Patient will be discharged home at this time, he is ambulating easily on his own here now without unsteadiness. Their lab and/or imaging results were explained with the patient and they were instructed to go over them with their PCP. All questions were answered at this time.     Patient was told to follow up with their PCP within the next 2 days. they were told to return to the ED if they have numbness, weakness, worsening unsteadiness    patient will be discharged home at this time, they are in agreement with the plan

## 2025-05-08 NOTE — ED ADULT NURSE NOTE - NS ED PATIENT SAFETY CONCERN
Outpatient Infusion Center Short Visit Progress Note    1000  Pt admit to [facility] for port flush ambulatory in stable condition. No acute concerns voiced. Patient Vitals for the past 12 hrs:   Temp Pulse Resp BP   07/06/17 0959 97.7 °F (36.5 °C) 64 18 (!) 154/93       Right chest port accessed with 20 gauge 0.75 inch guallpa needle with positive blood return. Port flushed and deaccessed per [employer]. 1010  Pt tolerated treatment well. D/c home ambulatory in no distress.  Pt aware of next appointment scheduled for 8/3/17 at 9 am. No

## 2025-05-08 NOTE — ED PROVIDER NOTE - PATIENT PORTAL LINK FT
You can access the FollowMyHealth Patient Portal offered by [facility] by registering at the following website: [email or web address]. By joining [facility]’s FollowMyHealth portal, you will also be able to view your health information using other applications (apps) compatible with our system. 24

## 2025-05-08 NOTE — ED ADULT TRIAGE NOTE - CHIEF COMPLAINT QUOTE
ambulatory to triage.  for the past 6-7 days, I have dizziness and severe, piercing headaches.  I did not work, and so I rested, but sought no medical attention.  Today, I went to [facility] and they referred me here.  the headaches and dizziness are not 100% persistent, but they are more frequent and so I decided to finally have it looked at.  I did call my PMD, but the closed available appt she can give me is September.

## 2025-05-08 NOTE — ED PROVIDER NOTE - NSFOLLOWUPINSTRUCTIONS_ED_ALL_ED_FT
Please return to the Emergency Department immediately for numbness, weakness, unsteadiness and if you have any other problems or concerns. Please follow up with your Primary Care Physician within the next 2 days.    Please take any prescription medications prescribed as instructed    Please follow up with neurologist, PCP within the next 2 days as well    If you do not have a physician or have difficulty following up, please call: [phone] to obtain a [facility] doctor or specialist who can provide follow up.          DIZZINESS - General Information    Dizziness    WHAT YOU NEED TO KNOW:    What is dizziness? Dizziness is a feeling of being off balance or unsteady. Common causes of dizziness are an inner ear fluid imbalance or a lack of oxygen in your blood. Dizziness may be acute (lasts 3 days or less) or chronic (lasts longer than 3 days). You may have dizzy spells that last from seconds to a few hours.    What increases my risk for dizziness? Dizziness may get worse during certain activities or when you move a certain way. The following may also increase your risk for dizziness:    Older age    An infection, ear surgery, or an inner ear condition, such as Ménière disease    Stroke, a brain tumor, or a recent head trauma    An injury that causes a large amount of blood loss    Heart or blood pressure problems    Exposure to chemicals, or long-term alcohol use    Medicines used to treat high blood pressure, seizure disorders, or anxiety and depression    A nerve disorder, such as multiple sclerosis  What signs and symptoms may happen with dizziness?    A feeling that your surroundings are moving even though you are standing still    Ringing in your ears or hearing loss    Feeling faint or lightheaded    Weakness or unsteadiness    Double vision or eye movements you cannot control    Nausea or vomiting    Confusion  How is the cause of dizziness diagnosed? Your healthcare provider may ask when the dizziness started. Tell the provider if you have dizzy spells, and how long they last. Tell the provider what happens before you become dizzy. The provider will ask if you have other health conditions and if you take any medicines. The provider will check your blood pressure and pulse to see if your dizziness may be related to your heart. Your balance, strength, reflexes, and the way you walk may also be checked. You may need any of the following tests to help find the cause of your dizziness:    An EKG records the electrical activity of your heart. An EKG can be used to check for an abnormal heart beat or heart damage.    Blood tests will check your blood sugar level, infection, and your blood cell levels.    CT or MRI pictures check for a stroke, head injury, or brain tumor. They also check for brain bleeding or swelling. You may be given contrast liquid to help your brain show up better in the pictures. Tell the healthcare provider if you have ever had an allergic reaction to contrast liquid. Do not enter the MRI room with anything metal. Metal can cause serious injury. Tell the healthcare provider if you have any metal in or on your body.  How is dizziness treated? Treatment will depend on the cause of your dizziness. Your healthcare provider may give you oxygen or medicines to decrease your dizziness and nausea. Your provider may also refer you to a specialist. You may need to be admitted to the hospital for treatment.    How can I manage my symptoms?    Do not drive or operate heavy machinery when you are dizzy.    Get up slowly from sitting or lying down.    Drink plenty of liquids. Liquids help prevent dehydration. Ask how much liquid to drink each day and which liquids are best for you.  When should I seek immediate care?    You have a headache and a stiff neck.    You have shaking chills and a fever.    You vomit over and over with no relief.    Your vomit or bowel movements are red or black.    You have pain in your chest, back, or abdomen.    You have numbness, especially in your face, arms, or legs.    You have trouble moving your arms or legs.    You are confused.  When should I contact my healthcare provider?    You have a fever.    Your symptoms do not get better with treatment.    You have questions or concerns about your condition or care.  CARE AGREEMENT:    You have the right to help plan your care. Learn about your health condition and how it may be treated. Discuss treatment options with your healthcare providers to decide what care you want to receive. You always have the right to refuse treatment.

## 2025-05-08 NOTE — ED PROVIDER NOTE - OBJECTIVE STATEMENT
65M with no PMh who presents with headaches and dizziness. patient reports that for the past 7 days, worsening dizziness. he states initially more intermittent, feeling unsteady and difficulty walking and right sided pulsatile headache. past 2 days more constant and now he is constantly unsteady and has been much more immobile. never had this before. he does admit to being very stressed recently, has been out of work for a while as well

## 2025-05-08 NOTE — ED PROVIDER NOTE - CARE PROVIDER_API CALL
Rigo Servin  Neurology  [address] [place] [address]  Phone: [phone]  Fax: [phone]  Follow Up Time:     Your, PCP  Phone: (   )    -  Fax: (   )    -  Follow Up Time:

## 2025-05-08 NOTE — ED ADULT NURSE NOTE - OBJECTIVE STATEMENT
Pt presents to the ED c/o dizziness, and headache. Reports BP elevated @ home. Takes losartan for HTN. Reports blurry vision, pt endorses being under a lot of stress lately. IV established in left arm with a 20G, labs drawn and sent, call bell in reach, warm blanket provided, bed in lowest position, side rails up x2, MD evaluation in progress, pt on telemetry. Denies chest pain, n/v, sob.

## 2025-05-08 NOTE — ED PROVIDER NOTE - CLINICAL SUMMARY MEDICAL DECISION MAKING FREE TEXT BOX
65M with no PMh who presents with headaches and dizziness. patient reports that for the past 7 days, worsening dizziness. he states initially more intermittent, feeling unsteady and difficulty walking and right sided pulsatile headache. past 2 days more constant and now he is constantly unsteady and has been much more immobile. never had this before. he does admit to being very stressed recently, has been out of work for a while as well    plan for labs, imaging, medications, reassess. concern for posterior stroke

## 2025-05-08 NOTE — ED PROVIDER NOTE - PROVIDER TOKENS
PROVIDER:[TOKEN:[[id number]]],FREE:[LAST:[Your],FIRST:[PCP],PHONE:[(   )    -],FAX:[(   )    -]]

## 2025-05-08 NOTE — ED PROVIDER NOTE - PHYSICAL EXAMINATION
Gen: Awake, Alert, NAD  Head:  NC/AT  Eyes:  PERRL, EOMI, Conjunctiva pink, no scleral icterus  Cardiac/CV:  S1 S2, RRR, no murmurs  Respiratory/Pulm:  CTAB, good air movement, normal resp effort, no wheezes/stridor/retractions/rales/rhonchi  Gastrointestinal/Abdomen:  Soft, nontender, nondistended, no rebound/guarding  Ext:  warm, well perfused, moving all extremities spontaneously, no edema, distal pulses intact  Skin: intact, no rash, no ecchymosis  Neuro:  AAOx3, sensation intact, motor 5/5 x 4 extremities, clear speech, unsteady with standing or walking, normal finger to nose, no facial droop, no pronator drift

## 2025-05-09 PROCEDURE — 99285 EMERGENCY DEPT VISIT HI MDM: CPT | Mod: 25

## 2025-05-09 PROCEDURE — 93005 ELECTROCARDIOGRAM TRACING: CPT

## 2025-05-09 PROCEDURE — 70496 CT ANGIOGRAPHY HEAD: CPT

## 2025-05-09 PROCEDURE — 83735 ASSAY OF MAGNESIUM: CPT

## 2025-05-09 PROCEDURE — 82962 GLUCOSE BLOOD TEST: CPT

## 2025-05-09 PROCEDURE — 70498 CT ANGIOGRAPHY NECK: CPT

## 2025-05-09 PROCEDURE — 85610 PROTHROMBIN TIME: CPT

## 2025-05-09 PROCEDURE — 85730 THROMBOPLASTIN TIME PARTIAL: CPT

## 2025-05-09 PROCEDURE — 36415 COLL VENOUS BLD VENIPUNCTURE: CPT

## 2025-05-09 PROCEDURE — 85025 COMPLETE CBC W/AUTO DIFF WBC: CPT

## 2025-05-09 PROCEDURE — 80053 COMPREHEN METABOLIC PANEL: CPT

## 2025-05-09 PROCEDURE — 96374 THER/PROPH/DIAG INJ IV PUSH: CPT | Mod: XU

## 2025-05-09 PROCEDURE — 70450 CT HEAD/BRAIN W/O DYE: CPT

## 2025-05-09 PROCEDURE — 96375 TX/PRO/DX INJ NEW DRUG ADDON: CPT | Mod: XU

## 2025-05-09 RX ORDER — AMLODIPINE BESYLATE 10 MG/1
5 TABLET ORAL ONCE
Refills: 0 | Status: COMPLETED | OUTPATIENT
Start: 2025-05-09 | End: 2025-05-09

## 2025-05-09 RX ADMIN — AMLODIPINE BESYLATE 5 MILLIGRAM(S): 10 TABLET ORAL at 00:33

## 2025-05-15 ENCOUNTER — INPATIENT (INPATIENT)
Facility: HOSPITAL | Age: 65
LOS: 0 days | Discharge: HOME | DRG: 310 | End: 2025-05-16
Attending: STUDENT IN AN ORGANIZED HEALTH CARE EDUCATION/TRAINING PROGRAM | Admitting: STUDENT IN AN ORGANIZED HEALTH CARE EDUCATION/TRAINING PROGRAM
Payer: COMMERCIAL

## 2025-05-15 ENCOUNTER — RESULT REVIEW (OUTPATIENT)
Age: 65
End: 2025-05-15

## 2025-05-15 ENCOUNTER — APPOINTMENT (OUTPATIENT)
Dept: INTERNAL MEDICINE | Facility: CLINIC | Age: 65
End: 2025-05-15
Payer: COMMERCIAL

## 2025-05-15 ENCOUNTER — NON-APPOINTMENT (OUTPATIENT)
Age: 65
End: 2025-05-15

## 2025-05-15 VITALS
TEMPERATURE: 98.3 F | DIASTOLIC BLOOD PRESSURE: 72 MMHG | SYSTOLIC BLOOD PRESSURE: 120 MMHG | BODY MASS INDEX: 36.29 KG/M2 | HEART RATE: 116 BPM | WEIGHT: 245 LBS | HEIGHT: 69 IN | RESPIRATION RATE: 16 BRPM | OXYGEN SATURATION: 97 %

## 2025-05-15 VITALS
HEIGHT: 69 IN | SYSTOLIC BLOOD PRESSURE: 130 MMHG | TEMPERATURE: 99 F | RESPIRATION RATE: 16 BRPM | DIASTOLIC BLOOD PRESSURE: 96 MMHG | WEIGHT: 250 LBS | OXYGEN SATURATION: 97 % | HEART RATE: 92 BPM

## 2025-05-15 DIAGNOSIS — G89.29 OTHER CHRONIC PAIN: ICD-10-CM

## 2025-05-15 DIAGNOSIS — Z29.9 ENCOUNTER FOR PROPHYLACTIC MEASURES, UNSPECIFIED: ICD-10-CM

## 2025-05-15 DIAGNOSIS — I10 ESSENTIAL (PRIMARY) HYPERTENSION: ICD-10-CM

## 2025-05-15 DIAGNOSIS — R59.0 LOCALIZED ENLARGED LYMPH NODES: ICD-10-CM

## 2025-05-15 DIAGNOSIS — I48.91 UNSPECIFIED ATRIAL FIBRILLATION: ICD-10-CM

## 2025-05-15 DIAGNOSIS — R42 DIZZINESS AND GIDDINESS: ICD-10-CM

## 2025-05-15 LAB
ALBUMIN SERPL ELPH-MCNC: 3.5 G/DL — SIGNIFICANT CHANGE UP (ref 3.3–5)
ALP SERPL-CCNC: 62 U/L — SIGNIFICANT CHANGE UP (ref 40–120)
ALT FLD-CCNC: 23 U/L — SIGNIFICANT CHANGE UP (ref 12–78)
ANION GAP SERPL CALC-SCNC: 5 MMOL/L — SIGNIFICANT CHANGE UP (ref 5–17)
APTT BLD: 29.7 SEC — SIGNIFICANT CHANGE UP (ref 26.1–36.8)
AST SERPL-CCNC: 13 U/L — LOW (ref 15–37)
BASOPHILS # BLD AUTO: 0.05 K/UL — SIGNIFICANT CHANGE UP (ref 0–0.2)
BASOPHILS NFR BLD AUTO: 0.6 % — SIGNIFICANT CHANGE UP (ref 0–2)
BILIRUB SERPL-MCNC: 0.5 MG/DL — SIGNIFICANT CHANGE UP (ref 0.2–1.2)
BUN SERPL-MCNC: 20 MG/DL — SIGNIFICANT CHANGE UP (ref 7–23)
CALCIUM SERPL-MCNC: 8.6 MG/DL — SIGNIFICANT CHANGE UP (ref 8.5–10.1)
CHLORIDE SERPL-SCNC: 111 MMOL/L — HIGH (ref 96–108)
CO2 SERPL-SCNC: 26 MMOL/L — SIGNIFICANT CHANGE UP (ref 22–31)
CREAT SERPL-MCNC: 1.3 MG/DL — SIGNIFICANT CHANGE UP (ref 0.5–1.3)
EGFR: 62 ML/MIN/1.73M2 — SIGNIFICANT CHANGE UP
EGFR: 62 ML/MIN/1.73M2 — SIGNIFICANT CHANGE UP
EOSINOPHIL # BLD AUTO: 0.35 K/UL — SIGNIFICANT CHANGE UP (ref 0–0.5)
EOSINOPHIL NFR BLD AUTO: 4.3 % — SIGNIFICANT CHANGE UP (ref 0–6)
GLUCOSE SERPL-MCNC: 108 MG/DL — HIGH (ref 70–99)
HCT VFR BLD CALC: 45.1 % — SIGNIFICANT CHANGE UP (ref 39–50)
HGB BLD-MCNC: 15.6 G/DL — SIGNIFICANT CHANGE UP (ref 13–17)
IMM GRANULOCYTES NFR BLD AUTO: 0.2 % — SIGNIFICANT CHANGE UP (ref 0–0.9)
INR BLD: 0.91 RATIO — SIGNIFICANT CHANGE UP (ref 0.85–1.16)
LYMPHOCYTES # BLD AUTO: 2.64 K/UL — SIGNIFICANT CHANGE UP (ref 1–3.3)
LYMPHOCYTES # BLD AUTO: 32.6 % — SIGNIFICANT CHANGE UP (ref 13–44)
MAGNESIUM SERPL-MCNC: 1.9 MG/DL — SIGNIFICANT CHANGE UP (ref 1.6–2.6)
MCHC RBC-ENTMCNC: 28.9 PG — SIGNIFICANT CHANGE UP (ref 27–34)
MCHC RBC-ENTMCNC: 34.6 G/DL — SIGNIFICANT CHANGE UP (ref 32–36)
MCV RBC AUTO: 83.7 FL — SIGNIFICANT CHANGE UP (ref 80–100)
MONOCYTES # BLD AUTO: 0.82 K/UL — SIGNIFICANT CHANGE UP (ref 0–0.9)
MONOCYTES NFR BLD AUTO: 10.1 % — SIGNIFICANT CHANGE UP (ref 2–14)
NEUTROPHILS # BLD AUTO: 4.22 K/UL — SIGNIFICANT CHANGE UP (ref 1.8–7.4)
NEUTROPHILS NFR BLD AUTO: 52.2 % — SIGNIFICANT CHANGE UP (ref 43–77)
NRBC BLD AUTO-RTO: 0 /100 WBCS — SIGNIFICANT CHANGE UP (ref 0–0)
PLATELET # BLD AUTO: 255 K/UL — SIGNIFICANT CHANGE UP (ref 150–400)
POTASSIUM SERPL-MCNC: 3.8 MMOL/L — SIGNIFICANT CHANGE UP (ref 3.5–5.3)
POTASSIUM SERPL-SCNC: 3.8 MMOL/L — SIGNIFICANT CHANGE UP (ref 3.5–5.3)
PROT SERPL-MCNC: 7.4 G/DL — SIGNIFICANT CHANGE UP (ref 6–8.3)
PROTHROM AB SERPL-ACNC: 10.6 SEC — SIGNIFICANT CHANGE UP (ref 9.9–13.4)
RBC # BLD: 5.39 M/UL — SIGNIFICANT CHANGE UP (ref 4.2–5.8)
RBC # FLD: 14.9 % — HIGH (ref 10.3–14.5)
SODIUM SERPL-SCNC: 142 MMOL/L — SIGNIFICANT CHANGE UP (ref 135–145)
TROPONIN I, HIGH SENSITIVITY RESULT: 29.2 NG/L — SIGNIFICANT CHANGE UP
TSH SERPL-MCNC: 2.59 UIU/ML — SIGNIFICANT CHANGE UP (ref 0.36–3.74)
WBC # BLD: 8.1 K/UL — SIGNIFICANT CHANGE UP (ref 3.8–10.5)
WBC # FLD AUTO: 8.1 K/UL — SIGNIFICANT CHANGE UP (ref 3.8–10.5)

## 2025-05-15 PROCEDURE — 99285 EMERGENCY DEPT VISIT HI MDM: CPT

## 2025-05-15 PROCEDURE — 93000 ELECTROCARDIOGRAM COMPLETE: CPT

## 2025-05-15 PROCEDURE — 93306 TTE W/DOPPLER COMPLETE: CPT | Mod: 26

## 2025-05-15 PROCEDURE — G2211 COMPLEX E/M VISIT ADD ON: CPT | Mod: NC

## 2025-05-15 PROCEDURE — 99223 1ST HOSP IP/OBS HIGH 75: CPT | Mod: GC

## 2025-05-15 PROCEDURE — 99222 1ST HOSP IP/OBS MODERATE 55: CPT

## 2025-05-15 PROCEDURE — 99233 SBSQ HOSP IP/OBS HIGH 50: CPT | Mod: GC

## 2025-05-15 PROCEDURE — 99214 OFFICE O/P EST MOD 30 MIN: CPT

## 2025-05-15 RX ORDER — ACETAMINOPHEN 500 MG/5ML
650 LIQUID (ML) ORAL EVERY 6 HOURS
Refills: 0 | Status: DISCONTINUED | OUTPATIENT
Start: 2025-05-15 | End: 2025-05-16

## 2025-05-15 RX ORDER — TRAMADOL HYDROCHLORIDE 50 MG/1
50 TABLET, FILM COATED ORAL
Refills: 0 | Status: DISCONTINUED | OUTPATIENT
Start: 2025-05-15 | End: 2025-05-16

## 2025-05-15 RX ORDER — MECLIZINE HCL 12.5 MG
25 TABLET ORAL EVERY 6 HOURS
Refills: 0 | Status: DISCONTINUED | OUTPATIENT
Start: 2025-05-15 | End: 2025-05-16

## 2025-05-15 RX ORDER — MAGNESIUM SULFATE 500 MG/ML
1 SYRINGE (ML) INJECTION ONCE
Refills: 0 | Status: COMPLETED | OUTPATIENT
Start: 2025-05-15 | End: 2025-05-15

## 2025-05-15 RX ORDER — HYDROXYZINE HYDROCHLORIDE 25 MG/1
25 TABLET, FILM COATED ORAL
Refills: 0 | Status: DISCONTINUED | OUTPATIENT
Start: 2025-05-15 | End: 2025-05-16

## 2025-05-15 RX ORDER — CELECOXIB 50 MG/1
200 CAPSULE ORAL
Refills: 0 | Status: DISCONTINUED | OUTPATIENT
Start: 2025-05-15 | End: 2025-05-15

## 2025-05-15 RX ORDER — MELATONIN 5 MG
3 TABLET ORAL AT BEDTIME
Refills: 0 | Status: DISCONTINUED | OUTPATIENT
Start: 2025-05-15 | End: 2025-05-16

## 2025-05-15 RX ORDER — APIXABAN 2.5 MG/1
5 TABLET, FILM COATED ORAL EVERY 12 HOURS
Refills: 0 | Status: DISCONTINUED | OUTPATIENT
Start: 2025-05-15 | End: 2025-05-16

## 2025-05-15 RX ORDER — METOPROLOL SUCCINATE 50 MG/1
25 TABLET, EXTENDED RELEASE ORAL EVERY 24 HOURS
Refills: 0 | Status: DISCONTINUED | OUTPATIENT
Start: 2025-05-15 | End: 2025-05-16

## 2025-05-15 RX ADMIN — APIXABAN 5 MILLIGRAM(S): 2.5 TABLET, FILM COATED ORAL at 17:30

## 2025-05-15 RX ADMIN — Medication 40 MILLIEQUIVALENT(S): at 17:30

## 2025-05-15 RX ADMIN — TRAMADOL HYDROCHLORIDE 50 MILLIGRAM(S): 50 TABLET, FILM COATED ORAL at 18:03

## 2025-05-15 RX ADMIN — Medication 1000 MILLILITER(S): at 13:53

## 2025-05-15 RX ADMIN — Medication 100 GRAM(S): at 18:03

## 2025-05-15 RX ADMIN — METOPROLOL SUCCINATE 25 MILLIGRAM(S): 50 TABLET, EXTENDED RELEASE ORAL at 17:30

## 2025-05-15 NOTE — H&P ADULT - NSHPSOCIALHISTORY_GEN_ALL_CORE
Tobacco: denies  EtOH: denies  Recreational drug use: denies  Lives with: patient lives alone at home  Ambulates: independent  ADLs: independent Tobacco: smokes cigars occasionally, last cigar was months ago   EtOH: denies  Recreational drug use: denies  Lives with: patient lives with wife   Ambulates: independent  ADLs: independent

## 2025-05-15 NOTE — ED ADULT NURSE NOTE - NSFALLUNIVINTERV_ED_ALL_ED
Bed/Stretcher in lowest position, wheels locked, appropriate side rails in place/Call bell, personal items and telephone in reach/Instruct patient to call for assistance before getting out of bed/chair/stretcher/Non-slip footwear applied when patient is off stretcher/[place] to call system/Physically safe environment - no spills, clutter or unnecessary equipment/Purposeful proactive rounding/Room/bathroom lighting operational, light cord in reach

## 2025-05-15 NOTE — PATIENT PROFILE ADULT - HAVE YOU BEEN EATING POORLY BECAUSE OF A DECREASED APPETITE?
Fanny was seen in the clinic today for a hearing evaluation. Her mother reported concerns with recurrent ear infections and a right perforated tympanic membrane.     Soundfield Visual Reinforcement Audiometry (VRA) revealed responses to narrowband noise stimuli from 15-20 dBHL in the 500-4000 Hz frequency range. A speech awareness threshold was obtained in soundfield at 15 dBHL.    Recommendations:  1. Otologic evaluation  2. Follow-up hearing evaluation, as needed           No (0)

## 2025-05-15 NOTE — H&P ADULT - ATTENDING COMMENTS
Agree with above.    Symptoms concerning for possible symptomatic afib. Found to be in the 110s in his PCP's office. EKG last week when he was here was sinus rhythm (possibly he was just not in afib at that moment). Imaging of head unremarkable. EKG here showing AFIB. TSH WNL. Plan for Eliquis, rate control with Toprol, VIRGINIE/TTE and likely DCCV if he doesn't self-convert before then.    Rest as above Agree with above.    Symptoms concerning for possible symptomatic afib. Found to be in the 110s in his PCP's office. EKG last week when he was here was sinus rhythm (possibly he was just not in afib at that moment). Imaging of head unremarkable. EKG here showing AFIB. TSH WNL. Plan for Eliquis, rate control with Toprol, VIRGINIE/TTE and likely DCCV if he doesn't self-convert before then.    Though upon interview he describes vertigo which occurs with position changes. He denies palpitations. If he is in sinus rhythm and still has symptoms would look into other etiologies such as BPPV. This can be worked up as an outpatient with neuro and MRI brain. No tinnitus. Can trial Meclizine PRN.    Would hold Celebrex if on Eliquis    Rest as above

## 2025-05-15 NOTE — H&P ADULT - PROBLEM SELECTOR PLAN 4
-chronic  -hx of back pain, knee pain, and osteoarthritis   -continue home meloxicam 15mg qd and tramadol 50mg BID -chronic  -hx of back pain, knee pain, and osteoarthritis   -on home tramadol 50mg BID and meloxicam 15mg qd   -continue tramadol 50mg BID, hold meloxicam -chronic  -hx of back pain, knee pain, and osteoarthritis   -on home tramadol 50mg BID and meloxicam 15mg qd   -continue tramadol 50mg BID, hold meloxicam for now -chronic  -continue home hydroxyzine 25mg BID prn

## 2025-05-15 NOTE — H&P ADULT - PROBLEM SELECTOR PLAN 5
-start Eliquis 5mg BID -chronic  -hx of back pain, knee pain, and osteoarthritis   -on home tramadol 50mg BID and meloxicam 15mg qd   -continue tramadol 50mg BID, hold meloxicam for now

## 2025-05-15 NOTE — ED PROVIDER NOTE - CLINICAL SUMMARY MEDICAL DECISION MAKING FREE TEXT BOX
Patient referred from PMDs office for new onset A-fib.  Patient relates he was seen in this ER on May 8 for dizziness he had labs CT CTA was discharged and followed up with his PMD today who noted that he was in rapid A-fib.  Patient reports intermittent dizziness none currently.  Patient denies fevers chills chest pain short of breath palpitations abdominal pain nausea vomiting weakness numbness.  PMD Tanja cardiologist none    Plan EKG labs IV fluids cardiology consult    Differential including but not limited to SVT rapid A-fib sinus tach or other arrhythmia electrolyte abnormality dehydration

## 2025-05-15 NOTE — ED PROVIDER NOTE - DIFFERENTIAL DIAGNOSIS
Differential including but not limited to SVT rapid A-fib sinus tach or other arrhythmia electrolyte abnormality dehydration Differential Diagnosis

## 2025-05-15 NOTE — H&P ADULT - NSHPREVIEWOFSYSTEMS_GEN_ALL_CORE
CONSTITUTIONAL: +lightheadedness, denies fever, chills, dizziness,   HEENT: denies blurred vision, sore throat, cough  SKIN: denies new lesions, rash, hives, itching  CARDIOVASCULAR: denies chest pain, chest pressure, palpitations  RESPIRATORY: denies shortness of breath, JONES, wheezing, sputum production  GASTROINTESTINAL: denies nausea, vomiting, diarrhea, constipation, abdominal pain  GENITOURINARY: denies dysuria, polyuria, hematuria, discharge  NEUROLOGICAL: +headache, denies syncope,  MUSCULOSKELETAL: denies new joint pain, joint swelling, muscle aches

## 2025-05-15 NOTE — CONSULT NOTE ADULT - ASSESSMENT
A  65-year-old male with PMHx of HTN sent from his PMD office due to new onset  rapid A-fib found today, now rate controlled in ED  1) Admit to telemetry via Hopsitalist team  2) Continue tele monitoring  3) Initiate eliquis 5 mg po BID now & continue uninterrupted  throughout periprocedural phase post DCCV ( CHADSVASC2-1) CrCl-93ml/min.  4) NPO after midnight for VIRGINIE/DCCV in am ( pt without any contraindications for VIRGINIE )  5) Optimize electrolytes Keep K > 4.0, Mg > 2.0  6) Initiate BB Metoprolol succinate 25 mg po daily with hold parameters  7) Optimize HTN management via Medicine team  8) TTE ordered / pending  9) EKGs/ cardiac enzymes x2  upon admission  10) Pt education r/t AF and future f/u for outpt evaluation of RFCA  11) F/U as outpt in 4 weeks with Dr Ralph Electrophysiologist  A  65-year-old male with PMHx of HTN sent from his PMD office due to new onset  rapid A-fib found today, now rate controlled in ED  1) Admit to telemetry via Hospitalist team  2) Continue tele monitoring  3) Initiate eliquis 5 mg po BID now & continue uninterrupted  throughout periprocedural phase post DCCV ( CHADSVASC2-1) CrCl-93ml/min.  4) NPO after midnight for VIRGINIE/DCCV in am ( pt without any contraindications for VIRGINIE )  5) Optimize electrolytes Keep K > 4.0, Mg > 2.0  6) Initiate BB Metoprolol succinate 25 mg po daily with hold parameters  7) Optimize HTN management via Medicine team  8) TTE ordered / pending  9) EKGs/ cardiac enzymes x2  upon admission  10) Pt education r/t AF and future f/u for outpt evaluation of RFCA  11) F/U as outpt in 4 weeks with Dr Ralph Electrophysiologist

## 2025-05-15 NOTE — ED ADULT NURSE NOTE - NS ED NURSE LEVEL OF CONSCIOUSNESS MENTAL STATUS
Awake/Alert This was a shared visit with the ANAHY. I reviewed and verified the documentation and independently performed the documented:

## 2025-05-15 NOTE — H&P ADULT - PROBLEM SELECTOR PLAN 3
LOV: 1/15/18 JV   Future office visit: No upcoming visit   Last labs: 2/2/8/18 Cbc Cmp Iron Lipase   Last RX: Temazepam 8/4/17 #30 No Refills  Per protocol: Route to provider -start Eliquis 5mg BID -chronic  -continue home hydralazine 25mg BID prn -chronic  -continue home hydroxyzine 25mg BID prn -chronic  -BP on admission: 130/96  -on home amlodipine 5mg qd  -as per cardio, dc amlodipine and start metoprolol 25mg qd, with hold parameters   -continue to monitor hemodynamics

## 2025-05-15 NOTE — H&P ADULT - PROBLEM SELECTOR PLAN 2
-chronic  -BP on admission: 130/96  -on home amlodipine 5mg qd  -as per cardio, dc amlodipine and start metoprolol 25mg qd, with hold parameters   -continue to monitor hemodynamics -Pt reporting dizziness when standing up or with positional changes, concerning for BPPV  -seen in ED 05/08 for similar symptoms and pt declined further workup with MRI   -CT findings from 05/08:  CT HEAD: No acute intracranial hemorrhage, mass effect, or midline shift.  CTA neck: No evidence of significant stenosis or occlusion. Mediastinal lymphadenopathy is incidentally noted.  CTA head: No large vessel occlusion, significant stenosis or vascular abnormality identified  -order orthostatics, f/u results   -will start meclizine 25mg q6h prn   -monitor BP   -if symptoms do not resolve after DCCV, consider MRI head for further workup

## 2025-05-15 NOTE — ED ADULT NURSE NOTE - OBJECTIVE STATEMENT
patient comes in from doctors office. patient states that he has been feeling on and off for the last week. patient was seen and treated here last week for high blood pressure. patient went to his primary doctor, Dr. Agrawal and was told he is in rapid AFIB. HR noted to be in the 120's. patient denies any palpitations or chest pain or SOB at this time.

## 2025-05-15 NOTE — CONSULT NOTE ADULT - ATTENDING COMMENTS
65-year-old male with PMHx of HTN sent from his PMD office due to rapid A-fib found today    - Patient presents tot he ED, sent in from PCP for abnormal EKG   - EKG at PCPs office with new onset AF  - Has been feeling "strange" and intermittently dizzy with changes in positive over the last several weeks  - EKG here confirms AF  - Trop negative x 1  - CHADSVASC 1 (HTN)  - Recommended OP mgmt of his AF and VIRGINIE/DCCV but pt would like to have this done tomorrow AM  - EP consulted for VIRGINIE/DCCV in AM  - TSH WNL  - Initiate Eliquis 5mg BID  - Initiate Metop Succinate 25mg QD first dose now   - Tele monitoring  - Keep K>4, Mg>2  - Needs outpatient sleep study no

## 2025-05-15 NOTE — ED PROVIDER NOTE - NSFOLLOWUPINSTRUCTIONS_ED_ALL_ED_FT
Atrial fibrillation (AFib) is a type of heartbeat that is irregular or fast. If you have AFib, your heart beats without any order. This makes it hard for your heart to pump blood in a normal way.    AFib may come and go, or it may become a long-lasting problem. If AFib is not treated, it can put you at higher risk for stroke, heart failure, and other heart problems.    What are the causes?  A heart with normal electrical activity and a heart with abnormal electrical activity.  AFib may be caused by diseases that damage the heart's electrical system. They include:  High blood pressure.  Heart failure.  Heart valve diseases.  Heart surgery.  Diabetes.  Thyroid disease.  Kidney disease.  Lung diseases, such as pneumonia or COPD.  Sleep apnea.  Sometimes the cause is not known.    What increases the risk?  You are more likely to develop AFib if:  You are older.  You exercise often and very hard.  You have a family history of AFib.  You are male.  You are .  You are overweight.  You smoke.  You drink a lot of alcohol.  What are the signs or symptoms?  Common symptoms of this condition include:  A feeling that your heart is beating very fast.  Chest pain or discomfort.  Feeling short of breath.  Suddenly feeling light-headed or weak.  Getting tired easily during activity.  Fainting.  Sweating.  In some cases, there are no symptoms.    How is this treated?  Medicines to:  Prevent blood clots.  Treat heart rate or heart rhythm problems.  Using devices, such as a pacemaker, to correct heart rhythm problems.  Doing surgery to remove the part of the heart that sends bad signals.  Closing an area where clots can form in the heart (left atrial appendage).  In some cases, your doctor will treat other underlying conditions.    Follow these instructions at home:  Medicines    Take over-the-counter and prescription medicines only as told by your doctor.  Do not take any new medicines without first talking to your doctor.  If you are taking blood thinners:  Talk with your doctor before taking aspirin or NSAIDs, such as ibuprofen.  Take your medicines as told. Take them at the same time each day.  Do not do things that could hurt or bruise you. Be careful to avoid falls.  Wear an alert bracelet or carry a card that says you take blood thinners.  Lifestyle    Do not smoke or use any products that contain nicotine or tobacco. If you need help quitting, ask your doctor.  Eat heart-healthy foods. Talk with your doctor about the right eating plan for you.  Exercise regularly as told by your doctor.  Do not drink alcohol.  Lose weight if you are overweight.  General instructions    If you have sleep apnea, treat it as told by your doctor.  Do not use diet pills unless your doctor says they are safe for you. Diet pills may make heart problems worse.  Keep all follow-up visits. Your doctor will check your heart rate and rhythm regularly.  Contact a doctor if:  You notice a change in the speed, rhythm, or strength of your heartbeat.  You are taking a blood-thinning medicine and you get more bruising.  You get tired more easily when you move or exercise.  You have a sudden change in weight.  Get help right away if:    You have pain in your chest.  You have trouble breathing.  You have side effects of blood thinners, such as blood in your vomit, poop (stool), or pee (urine), or bleeding that cannot stop.  You have any signs of a stroke. "BE FAST" is an easy way to remember the main warning signs:  B - Balance. Dizziness, sudden trouble walking, or loss of balance.  E - Eyes. Trouble seeing or a change in how you see.  F - Face. Sudden weakness or loss of feeling in the face. The face or eyelid may droop on one side.  A - Arms.Weakness or loss of feeling in an arm. This happens suddenly and usually on one side of the body.  S - Speech. Sudden trouble speaking, slurred speech, or trouble understanding what people say.  T - Time.Time to call emergency services. Write down what time symptoms started.  You have other signs of a stroke, such as:  A sudden, very bad headache with no known cause.  Feeling like you may vomit (nausea).  Vomiting.  A seizure.  These symptoms may be an emergency. Get help right away. Call 911.  Do not wait to see if the symptoms will go away.  Do not drive yourself to the hospital.  This information is not intended to replace advice given to you by your health care provider. Make sure you discuss any questions you have with your health care provider.

## 2025-05-15 NOTE — ED ADULT TRIAGE NOTE - CHIEF COMPLAINT QUOTE
Dizziness  since last week. Pt was seen in ED last week and was following-up with PMD Tanja and as pe PMD it was noted that pt was in rapid AFIB 120's. Denies any palpitations, chest pain or SOB

## 2025-05-15 NOTE — CONSULT NOTE ADULT - SUBJECTIVE AND OBJECTIVE BOX
Department of Cardiology                                                               Division of Interventional Cardiology                                                               [facility]/[place] [id number]                                                                                 [phone]                                                                                                                               Electrophysiology Consult / Pre-Procedure Note      Subjective/ROS:   Denies CP, SOB, palpitations, N/V, fever/chills, abd pain, numbness/tingling/weakness, other c/o at this time.  ROS negative x 10 systems except as documented as above.    HPI: A  65-year-old male with PMHx of HTN sent from his PMD office due to rapid A-fib found today.  Was seen in the ED about 1 week ago due to headache and dizziness that was going on for a week.  Described as an intermittent feeling of unsteadiness usually that he feels when he gets up.  Also intermittent right-sided pulsatile headache.  Pt admitted to increased stress related to not working for the past few months.  At that time had labs and imaging including CT of his head and CTA head and neck.  EKG showed normal sinus rhythm.  Was feeling better and offered further workup including MRI but wanted to go home.  Patient states his losartan was switched to amlodipine 5mg and hydroxyzine was given for anxiety.  Today he followed up with his PMD regarding medication changes and was told he needs to go straight to the ED due to abnormal EKG ( AF with RVR).  Denies history of abnormal EKG in the past.  Has not followed up with a cardiologist.  Denies chest pain, trouble breathing, leg pain or swelling, no prior syncope.  Denies EtOH, drugs, tobacco.  Admits to drinking a lot of caffeine. ( 4 cups of coffee daily)    05/15/25- EP asked to evaluate pt by Cardiology team secondary to the aformentioned HPI ( new onset Rate controlled AF) with accompanying occ. palpitations Pt seen and examined in ED-CHADS VASC2-1 ( HTN) ,  Discussed and educated on the pillars of AF ( stroke prevention, rate ( BB)  / rhythm control/ need for VIRGINIE/DCCV in am, and quality of life ( living with AF-risk factor reduction/ modification ).  R/B/A of VIRGINIE/ DCCV reviewed with pt-pt understands if he converts to NSR spontaneously overnight the procedure will be deferred. Will reassess EKG in am.     PAST MEDICAL & SURGICAL HISTORY:  Knee injury      Low back pain      Unilateral primary osteoarthritis, right knee      Hypertension      H/O knee surgery  left replacement 10/28/20      H/O hammer toe correction  2020        FAMILY HISTORY:  FH: diabetes mellitus  mother father both   Denues any PMH of SCD in first degree relatives    Social History:  Renay ( currently unemployed) Occ gummy-(last week was last time) + etoh Beer in past none recently, + caffeine ( 4 cups coffee daily)  denies tobacco use      Echo:   ordered / pending     Prior Cardiac Interventions: none          Associated Risk Factors:        Frailty Screening: (N/A, mild, mod, severe)       Cerebrovascular Disease: N/A       Chronic Lung Disease: N/A       Peripheral Arterial Disease: N/A       Chronic Kidney Disease (if yes, what is GFR): N/A       Uncontrolled Diabetes (if yes, what is HgbA1C or FBS): N/A       Poorly Controlled Hypertension (if yes, what is SBP): N/A       Morbid Obesity (if yes, what is BMI): N/A       History of Recent Ventricular Arrhythmia: N/A       Inability to Ambulate Safely: N/A       Need for Therapeutic Anticoagulation: N/A       Antiplatelet or Contrast Allergy: N/A      MEDICATIONS  (STANDING):  apixaban 5 milliGRAM(s) Oral every 12 hours  magnesium sulfate  IVPB 1 Gram(s) IV Intermittent Once  metoprolol succinate ER 25 milliGRAM(s) Oral every 24 hours  potassium chloride    Tablet ER 40 milliEquivalent(s) Oral once    MEDICATIONS  (PRN):      No Known Allergies      T(C): 37.2 (05-15-25 @ 13:09), Max: 37.2 (05-15-25 @ 13:09)  HR: 92 (05-15-25 @ 13:09) (92 - 92)  BP: 130/96 (05-15-25 @ 13:09) (130/96 - 130/96)  RR: 16 (05-15-25 @ 13:09) (16 - 16)  SpO2: 97% (05-15-25 @ 13:09) (97% - 97%)  Wt(kg): --    I&O's Summary      Daily Height in cm: 175.26 (15 May 2025 13:09)    Daily       TELEMETRY:  Af/ flutter @ 98 bpm rate controlled    EC Lead ECG:   Ventricular Rate 96 BPM    QRS Duration 76 ms    Q-T Interval 360 ms    QTC Calculation(Bazett) 454 ms    R Axis -12 degrees    T Axis 122 degrees    Diagnosis Line Atrial fibrillation  Left ventricular hypertrophy with repolarization abnormality (R in aVL )  Abnormal ECG  When compared with ECG of 08-MAY-2025 19:48,  Atrial fibrillation has replaced Sinus rhythm  Confirmed by Nessa Kate ([id number]) on 5/15/2025 3:24:39 PM (05-15-25 @ 13:20)      LABS:	 	                        15.6   8.10  )-----------( 255      ( 15 May 2025 13:40 )             45.1     05-15    142  |  111[H]  |  20  ----------------------------<  108[H]  3.8   |  26  |  1.30    Ca    8.6      15 May 2025 13:40  Mg     1.9     05-15    TPro  7.4  /  Alb  3.5  /  TBili  0.5  /  DBili  x   /  AST  13[L]  /  ALT  23  /  AlkPhos  62  05-15      Constitutional: NAD  Neuro: A+O x 3, non-focal, speech clear and intact  HEENT: NC/AT, PERRL, EOMI, anicteric sclerae, oral mucosa pink and moist  Neck: supple, no JVD  CV: regular rate, irregular rhythm, +S1S2, no murmurs or rub  Pulm/chest: lung sounds CTA and equal bilaterally, no accessory muscle use noted  Abd: soft, NT, ND, +BS  Ext: RODRÍGUEZ x 4, no C/C/E  Pulses: R radial 2+, R femoral 2+, bilat DP 2+  Skin: warm, well perfused  Psych: calm, appropriate affect          
[facility]    Gretchen Brady, Trav, Kalli, Natalya, Shayy, [place]       [phone] (office)    Reason for Consult: abnormal ekg     HPI: 65-year-old male with PMHx of HTN sent from his PMD office due to rapid A-fib found today.  Was seen in the ED about 1 week ago due to headache and dizziness that was going on for a week.  Described as an intermittent feeling of unsteadiness usually that he feels when he gets up.  Also intermittent right-sided pulsatile headache.  Pt admitted to increase stress related to not working for the past few months.  At that time had labs and imaging including CT of his head and CTA head and neck.  EKG showed normal sinus rhythm.  Was feeling better and offered further workup including MRI but wanted to go home.  Patient states his losartan was switched to amlodipine 5mg and hydroxyzine was given for anxiety.  Today he followed up with his PMD regarding medication changes and was told he needs to go straight to the ED due to abnormal EKG.  Denies history of abnormal EKG in the past.  Has not followed up with a cardiologist.  Denies chest pain, trouble breathing, leg pain or swelling.  Denies EtOH, drugs, tobacco.  Admits to drinking a lot of caffeine.         PAST MEDICAL & SURGICAL HISTORY:  Knee injury      Low back pain      Unilateral primary osteoarthritis, right knee      Hypertension      H/O knee surgery  left replacement 10/28/20      H/O hammer toe correction  2020          SOCIAL HISTORY: No active tobacco, alcohol or illicit drug use    FAMILY HISTORY:  FH: diabetes mellitus  mother father both         Home Medications:  gabapentin 800 mg oral tablet: 1 tab(s) orally (2024 11:09)  losartan: orally once a day (2024 11:09)  tamsulosin 0.4 mg oral capsule: 1 cap(s) orally (2024 11:08)  traMADol 50 mg oral tablet: 1 tab(s) orally every 4 hours (2024 11:09)      MEDICATIONS  (STANDING):    MEDICATIONS  (PRN):      Allergies    No Known Allergies    Intolerances        REVIEW OF SYSTEMS: Negative except as per HPI.    VITAL SIGNS:   Vital Signs Last 24 Hrs  T(C): 37.2 (15 May 2025 13:09), Max: 37.2 (15 May 2025 13:09)  T(F): 98.9 (15 May 2025 13:09), Max: 98.9 (15 May 2025 13:09)  HR: 92 (15 May 2025 13:09) (92 - 92)  BP: 130/96 (15 May 2025 13:09) (130/96 - 130/96)  BP(mean): --  RR: 16 (15 May 2025 13:09) (16 - 16)  SpO2: 97% (15 May 2025 13:09) (97% - 97%)    Parameters below as of 15 May 2025 13:09  Patient On (Oxygen Delivery Method): room air        I&O's Summary      PHYSICAL EXAM:  Constitutional: NAD, well-developed  HEENT NC/AT, moist mucous membranes  Pulmonary: Non-labored, breath sounds are clear bilaterally, no wheezing, rales or rhonchi  Cardiovascular: +S1, S2, IRRR, no murmur  Gastrointestinal: Soft, nontender, nondistended, normoactive bowel sounds  Extremities: No peripheral edema   Neurological: Alert, strength and sensitivity are grossly intact  Skin: No obvious lesions/rashes  Psych: Mood & affect appropriate    LABS: All Labs Reviewed:                        15.6   8.10  )-----------( 255      ( 15 May 2025 13:40 )             45.1     15 May 2025 13:40    142    |  111    |  20     ----------------------------<  108    3.8     |  26     |  1.30     Ca    8.6        15 May 2025 13:40  Mg     1.9       15 May 2025 13:40    TPro  7.4    /  Alb  3.5    /  TBili  0.5    /  DBili  x      /  AST  13     /  ALT  23     /  AlkPhos  62     15 May 2025 13:40    PT/INR - ( 15 May 2025 13:40 )   PT: 10.6 sec;   INR: 0.91 ratio         PTT - ( 15 May 2025 13:40 )  PTT:29.7 sec      Blood Culture:     05-15 @ 13:40  TSH: 2.59      EKG: afib 96 bpm     RADIOLOGY:    CXR:

## 2025-05-15 NOTE — H&P ADULT - NSHPPHYSICALEXAM_GEN_ALL_CORE
T(C): 37.2 (05-15-25 @ 13:09), Max: 37.2 (05-15-25 @ 13:09)  HR: 92 (05-15-25 @ 13:09) (92 - 92)  BP: 130/96 (05-15-25 @ 13:09) (130/96 - 130/96)  RR: 16 (05-15-25 @ 13:09) (16 - 16)  SpO2: 97% (05-15-25 @ 13:09) (97% - 97%)    GENERAL: patient appears well, no acute distress, appropriate, pleasant  EYES: sclera clear, no exudates  ENMT: oropharynx clear without erythema, no exudates, moist mucous membranes  NECK: supple, soft, no thyromegaly noted  LUNGS: good air entry bilaterally, clear to auscultation, symmetric breath sounds, no wheezing or rhonchi appreciated  HEART: soft S1/S2, regular rate and rhythm, no murmurs noted, no lower extremity edema  GASTROINTESTINAL: abdomen is soft, nontender, nondistended  INTEGUMENT: good skin turgor, no lesions noted  MUSCULOSKELETAL: no clubbing or cyanosis, no obvious deformity  NEUROLOGIC: awake, alert, oriented x3, no obvious sensory deficits  PSYCHIATRIC: mood is good, affect is congruent, linear and logical thought process

## 2025-05-15 NOTE — ED ADULT NURSE NOTE - IN THE PAST 12 MONTHS HAVE YOU USED DRUGS OTHER THAN THOSE REQUIRED FOR MEDICAL REASON?
Scheduled date of EGD/colonoscopy (as of today): 1/31/23  Physician performing EGD/colonoscopy: Segun Coley  Location of EGD/colonoscopy: [address]  Desired bowel prep reviewed with patient: Miralax  Instructions reviewed with patient by: Lashon SCHRADER  Clearances:
No

## 2025-05-15 NOTE — H&P ADULT - ASSESSMENT
65 yr old M with PMHx of HTN presents to ED from PCP office for rapid afib.Pt admitted for afib and plan for DCCV in AM.        65 yr old M with PMHx of HTN presents to ED from PCP office for rapid afib.Pt admitted for afib and plan for DCCV in AM.

## 2025-05-15 NOTE — CONSULT NOTE ADULT - ASSESSMENT
- Patient presents tot he ED, sent in from PCP for abnormal EKG   - pt elicits recent episodes of dizziness with changes in position   - EKG on presentation shows afib 96bpm   - No prior hx of afib   - CHADSVASC 2  - would start metoprolol tartrate 25mg PO qd  - would recommend full AC with Eliquis   - Recent CT head wnl     - No clear evidence of acute ischemia, trops negative x 2. Will follow up third set.  - No acute changes on EKG compared to previous.  - outpatient cardiology follow up for TTE and stress test     - No meaningful evidence of volume overload.    - BP well controlled, monitor routine hemodynamics.  - Would dc amlodipine and start metoprolol tartrate 25mg PO qd     - Monitor and replete lytes, keep K>4, Mg>2.    - Other cardiovascular workup will depend on clinical course.  - All other workup per primary team.  - Will continue to follow.             - Patient presents tot he ED, sent in from PCP for abnormal EKG   - pt elicits recent episodes of dizziness with changes in position   - EKG on presentation shows afib 96bpm   - No prior hx of afib   - CHADSVASC 1  - would start metoprolol succinate 25mg PO qd this PM   - dc home amlodipine   - pt elected to be admitted for VIRGINIE cardioversion with tmrw   - would recommend full AC with Eliquis 5mg BID starting tonight   - Recent CT head wnl     - No clear evidence of acute ischemia, trops negative x 2. Will follow up third set.  - No acute changes on EKG compared to previous.    - No meaningful evidence of volume overload.    - BP well controlled, monitor routine hemodynamics.  - Would dc amlodipine and start metoprolol succinate 25mg PO qd     - Monitor and replete lytes, keep K>4, Mg>2.    - Other cardiovascular workup will depend on clinical course.  - All other workup per primary team.  - Will continue to follow.              65-year-old male with PMHx of HTN sent from his PMD office due to rapid A-fib found today    - Patient presents tot he ED, sent in from PCP for abnormal EKG   - pt elicits recent episodes of dizziness with changes in position   - EKG on presentation shows afib 96bpm   - No prior hx of afib   - CHADSVASC 1  - would start metoprolol succinate 25mg PO qd this PM   - dc home amlodipine   - pt elected to be admitted for VIRGINIE cardioversion with tmrw   - would recommend full AC with Eliquis 5mg BID starting tonight   - Recent CT head wnl     - No clear evidence of acute ischemia, trops negative x 1.   - No acute changes on EKG compared to previous.    - No meaningful evidence of volume overload.    - BP well controlled, monitor routine hemodynamics.  - Would dc amlodipine and start metoprolol succinate 25mg PO qd     - Monitor and replete lytes, keep K>4, Mg>2.    - Other cardiovascular workup will depend on clinical course.  - All other workup per primary team.  - Will continue to follow.

## 2025-05-15 NOTE — H&P ADULT - HISTORY OF PRESENT ILLNESS
65 yr old M with PMHx of HTN presents to ED for rapid afib. Pt was seen at his PCP office and was told to go to ED for abnormal EKG findings. Pt does not see a cardiologist. Pt was seen in ED about 1 week ago due to HAs and dizziness for a week. Reports occasional feeling of unsteadiness when he first stands up. Reports intermittent right sided pulsatile HA. Denies chest pain, SOB, abdominal pain, n/v, leg pain, swelling, weakness.     ED course:  Vitals: BP: 130/96 , HR: 92 , Temp: 98.9, RR: 16 , SpO2: 97% on RA   Labs: no significant findings on labs  EKG: HR 96, QTc 454, afib, left ventricular hypertrophy with repolarization abnormality  In ED given: 1L NS x1, magnesium IV 1g x1, K+ tab 40meq x1

## 2025-05-15 NOTE — ED PROVIDER NOTE - OBJECTIVE STATEMENT
65-year-old male sent from his PMD office due to rapid A-fib found today.  Was seen in the ED about 1 week ago due to headache and dizziness that was going on for a week.  Described as an intermittent feeling of unsteadiness usually that he feels when he gets up.  Also intermittent right-sided pulsatile headache.  Admitted to increase stress related to not working for the past few months.  At that time had labs and imaging including CT of his head and CTA head and neck.  EKG showed normal sinus rhythm.  Was feeling better and offered further workup including MRI but wanted to go home.  Patient states his losartan was switched to amlodipine and hydroxyzine was given for anxiety.  Today he followed up with his PMD regarding medication changes and was told he needs to go straight to the ED due to abnormal EKG.  Denies history of abnormal EKG in the past.  Has not followed up with a cardiologist.  Denies chest pain, trouble breathing, leg pain or swelling.  Denies EtOH, drugs, tobacco.  Admits to drinking a lot of caffeine.    pmtosin riley

## 2025-05-15 NOTE — H&P ADULT - PROBLEM SELECTOR PLAN 1
-Pt presents to ED for new onset rapid afib noted on EKG at PCP office. Pt reporting headache and occassional lightheadedness with standing  -Vitals: BP: 130/96 , HR: 92 , Temp: 98.9, RR: 16 , SpO2: 97% on RA   -Labs: no significant findings on labs  -EKG: HR 96, QTc 454, afib, left ventricular hypertrophy with repolarization abnormality  -trops neg x1   -TSH wnl   -tele monitoring   -plan for DCCV in AM, EP consulted by cardio   -NPO after midnight   -order TTE, f/u results   -start metoprolol 25mg qd with hold parameters   -start Eliquis 5mg BID   -keep K>4, Mg>2  -Cardiology consulted (Dr. Kate), recs appreciated -Pt presents to ED for new onset rapid afib noted on EKG at PCP office. Pt reporting headache and occasional lightheadedness with standing  -Vitals: BP: 130/96 , HR: 92 , Temp: 98.9, RR: 16 , SpO2: 97% on RA   -Labs: no significant findings on labs  -EKG: HR 96, QTc 454, afib, left ventricular hypertrophy with repolarization abnormality  -trops neg x1   -TSH wnl   -tele monitoring   -plan for DCCV in AM, EP consulted by cardio   -NPO after midnight   -order TTE, f/u results   -start metoprolol 25mg qd with hold parameters   -start Eliquis 5mg BID   -order orthostatics   -keep K>4, Mg>2  -Cardiology consulted (Dr. Kate), recs appreciated -Pt presents to ED for new onset rapid afib noted on EKG at PCP office. Pt reporting headache and occasional lightheadedness with standing  -Vitals: BP: 130/96 , HR: 92 , Temp: 98.9, RR: 16 , SpO2: 97% on RA   -Labs: no significant findings on labs  -EKG: HR 96, QTc 454, afib, left ventricular hypertrophy with repolarization abnormality  -trops neg x1   -TSH wnl   -tele monitoring   -plan for DCCV in AM, EP consulted by cardio   -NPO after midnight   -order TTE, f/u results   -start metoprolol 25mg qd with hold parameters   -start Eliquis 5mg BID   -order orthostatics   -keep K>4, Mg>2  -Cardiology consulted (Dr. Kate), recs appreciated  -EP consulted, f/u recs -Pt presents to ED for new onset rapid afib noted on EKG at PCP office. Pt reporting headache and occasional lightheadedness with standing  -Vitals: BP: 130/96 , HR: 92 , Temp: 98.9, RR: 16 , SpO2: 97% on RA   -Labs: no significant findings on labs  -EKG: HR 96, QTc 454, afib, left ventricular hypertrophy with repolarization abnormality  -trops neg x1   -TSH wnl   -tele monitoring   -plan for DCCV in AM, EP consulted by cardio   -NPO after midnight   -order TTE, f/u results   -start metoprolol 25mg qd with hold parameters   -start Eliquis 5mg BID   -keep K>4, Mg>2  -Cardiology consulted (Dr. Kate), recs appreciated  -EP consulted, f/u recs

## 2025-05-16 ENCOUNTER — TRANSCRIPTION ENCOUNTER (OUTPATIENT)
Age: 65
End: 2025-05-16

## 2025-05-16 VITALS
WEIGHT: 248.02 LBS | HEART RATE: 63 BPM | DIASTOLIC BLOOD PRESSURE: 99 MMHG | SYSTOLIC BLOOD PRESSURE: 154 MMHG | OXYGEN SATURATION: 94 % | RESPIRATION RATE: 18 BRPM | TEMPERATURE: 98 F

## 2025-05-16 DIAGNOSIS — I10 ESSENTIAL (PRIMARY) HYPERTENSION: ICD-10-CM

## 2025-05-16 DIAGNOSIS — F41.9 ANXIETY DISORDER, UNSPECIFIED: ICD-10-CM

## 2025-05-16 LAB
A1C WITH ESTIMATED AVERAGE GLUCOSE RESULT: 5.9 % — HIGH (ref 4–5.6)
ANION GAP SERPL CALC-SCNC: 5 MMOL/L — SIGNIFICANT CHANGE UP (ref 5–17)
BUN SERPL-MCNC: 20 MG/DL — SIGNIFICANT CHANGE UP (ref 7–23)
CALCIUM SERPL-MCNC: 8.8 MG/DL — SIGNIFICANT CHANGE UP (ref 8.5–10.1)
CHLORIDE SERPL-SCNC: 110 MMOL/L — HIGH (ref 96–108)
CO2 SERPL-SCNC: 27 MMOL/L — SIGNIFICANT CHANGE UP (ref 22–31)
CREAT SERPL-MCNC: 1.2 MG/DL — SIGNIFICANT CHANGE UP (ref 0.5–1.3)
EGFR: 68 ML/MIN/1.73M2 — SIGNIFICANT CHANGE UP
EGFR: 68 ML/MIN/1.73M2 — SIGNIFICANT CHANGE UP
ESTIMATED AVERAGE GLUCOSE: 123 MG/DL — HIGH (ref 68–114)
GLUCOSE SERPL-MCNC: 90 MG/DL — SIGNIFICANT CHANGE UP (ref 70–99)
HCT VFR BLD CALC: 42.7 % — SIGNIFICANT CHANGE UP (ref 39–50)
HGB BLD-MCNC: 14.4 G/DL — SIGNIFICANT CHANGE UP (ref 13–17)
MAGNESIUM SERPL-MCNC: 2.1 MG/DL — SIGNIFICANT CHANGE UP (ref 1.6–2.6)
MCHC RBC-ENTMCNC: 28.5 PG — SIGNIFICANT CHANGE UP (ref 27–34)
MCHC RBC-ENTMCNC: 33.7 G/DL — SIGNIFICANT CHANGE UP (ref 32–36)
MCV RBC AUTO: 84.4 FL — SIGNIFICANT CHANGE UP (ref 80–100)
NRBC BLD AUTO-RTO: 0 /100 WBCS — SIGNIFICANT CHANGE UP (ref 0–0)
PHOSPHATE SERPL-MCNC: 2.4 MG/DL — LOW (ref 2.5–4.5)
PLATELET # BLD AUTO: 223 K/UL — SIGNIFICANT CHANGE UP (ref 150–400)
POTASSIUM SERPL-MCNC: 4 MMOL/L — SIGNIFICANT CHANGE UP (ref 3.5–5.3)
POTASSIUM SERPL-SCNC: 4 MMOL/L — SIGNIFICANT CHANGE UP (ref 3.5–5.3)
RBC # BLD: 5.06 M/UL — SIGNIFICANT CHANGE UP (ref 4.2–5.8)
RBC # FLD: 15 % — HIGH (ref 10.3–14.5)
SODIUM SERPL-SCNC: 142 MMOL/L — SIGNIFICANT CHANGE UP (ref 135–145)
WBC # BLD: 8.13 K/UL — SIGNIFICANT CHANGE UP (ref 3.8–10.5)
WBC # FLD AUTO: 8.13 K/UL — SIGNIFICANT CHANGE UP (ref 3.8–10.5)

## 2025-05-16 PROCEDURE — 86901 BLOOD TYPING SEROLOGIC RH(D): CPT

## 2025-05-16 PROCEDURE — 84443 ASSAY THYROID STIM HORMONE: CPT

## 2025-05-16 PROCEDURE — 99285 EMERGENCY DEPT VISIT HI MDM: CPT | Mod: 25

## 2025-05-16 PROCEDURE — 86900 BLOOD TYPING SEROLOGIC ABO: CPT

## 2025-05-16 PROCEDURE — 85610 PROTHROMBIN TIME: CPT

## 2025-05-16 PROCEDURE — 84484 ASSAY OF TROPONIN QUANT: CPT

## 2025-05-16 PROCEDURE — 85027 COMPLETE CBC AUTOMATED: CPT

## 2025-05-16 PROCEDURE — 80048 BASIC METABOLIC PNL TOTAL CA: CPT

## 2025-05-16 PROCEDURE — 83735 ASSAY OF MAGNESIUM: CPT

## 2025-05-16 PROCEDURE — 80053 COMPREHEN METABOLIC PANEL: CPT

## 2025-05-16 PROCEDURE — 86850 RBC ANTIBODY SCREEN: CPT

## 2025-05-16 PROCEDURE — 85025 COMPLETE CBC W/AUTO DIFF WBC: CPT

## 2025-05-16 PROCEDURE — 93005 ELECTROCARDIOGRAM TRACING: CPT

## 2025-05-16 PROCEDURE — 93306 TTE W/DOPPLER COMPLETE: CPT

## 2025-05-16 PROCEDURE — 85730 THROMBOPLASTIN TIME PARTIAL: CPT

## 2025-05-16 PROCEDURE — 83036 HEMOGLOBIN GLYCOSYLATED A1C: CPT

## 2025-05-16 PROCEDURE — 99232 SBSQ HOSP IP/OBS MODERATE 35: CPT

## 2025-05-16 PROCEDURE — 36415 COLL VENOUS BLD VENIPUNCTURE: CPT

## 2025-05-16 PROCEDURE — 99239 HOSP IP/OBS DSCHRG MGMT >30: CPT | Mod: GC

## 2025-05-16 PROCEDURE — 93010 ELECTROCARDIOGRAM REPORT: CPT

## 2025-05-16 PROCEDURE — 84100 ASSAY OF PHOSPHORUS: CPT

## 2025-05-16 RX ORDER — MELOXICAM 15 MG/1
1 TABLET ORAL
Refills: 0 | DISCHARGE

## 2025-05-16 RX ORDER — MECLIZINE HCL 12.5 MG
1 TABLET ORAL
Qty: 21 | Refills: 0
Start: 2025-05-16 | End: 2025-05-22

## 2025-05-16 RX ORDER — METOPROLOL SUCCINATE 50 MG/1
1 TABLET, EXTENDED RELEASE ORAL
Qty: 30 | Refills: 0
Start: 2025-05-16 | End: 2025-06-14

## 2025-05-16 RX ORDER — APIXABAN 2.5 MG/1
1 TABLET, FILM COATED ORAL
Qty: 60 | Refills: 0
Start: 2025-05-16 | End: 2025-06-14

## 2025-05-16 RX ADMIN — APIXABAN 5 MILLIGRAM(S): 2.5 TABLET, FILM COATED ORAL at 05:11

## 2025-05-16 RX ADMIN — TRAMADOL HYDROCHLORIDE 50 MILLIGRAM(S): 50 TABLET, FILM COATED ORAL at 05:58

## 2025-05-16 RX ADMIN — TRAMADOL HYDROCHLORIDE 50 MILLIGRAM(S): 50 TABLET, FILM COATED ORAL at 05:12

## 2025-05-16 NOTE — DISCHARGE NOTE PROVIDER - NSDCMRMEDTOKEN_GEN_ALL_CORE_FT
amLODIPine 5 mg oral tablet: 1 tab(s) orally once a day  hydrOXYzine hydrochloride 25 mg oral tablet: 1 tab(s) orally 2 times a day as needed for  anxiety  meloxicam 15 mg oral tablet: 1 tab(s) orally once a day  traMADol 50 mg oral tablet: 1 tab(s) orally 2 times a day   apixaban 5 mg oral tablet: 1 tab(s) orally every 12 hours  hydrOXYzine hydrochloride 25 mg oral tablet: 1 tab(s) orally 2 times a day as needed for  anxiety  meclizine 25 mg oral tablet: 1 tab(s) orally every 8 hours as needed for Dizziness  metoprolol succinate 25 mg oral tablet, extended release: 1 tab(s) orally every 24 hours

## 2025-05-16 NOTE — DISCHARGE NOTE NURSING/CASE MANAGEMENT/SOCIAL WORK - FINANCIAL ASSISTANCE
[facility] provides services at a reduced cost to those who are determined to be eligible through [facility]’s financial assistance program. Information regarding [facility]’s financial assistance program can be found by going to [email or web address] or by calling [phone].

## 2025-05-16 NOTE — CHART NOTE - NSCHARTNOTEFT_GEN_A_CORE
Electrophysiology ACP Telemetry Review    Patient converted from AF with SR yesterday evening.  Remains in SR overnight and this morning.    Cancel VIRGINIE/DCCV.    Continue Eliquis for AC.  Continue BB and BP and Hr tolerates  remainder of plan per primary team/non-interventional cardiology  Will sign off from EP perspective. Please reconsult as necessary.

## 2025-05-16 NOTE — SBIRT NOTE ADULT - NSSBIRTDRGPOSREINDET_GEN_A_CORE
Pt reports using THC gummies a few times to help relax and aid in sleep. Pt reports no issues with use.

## 2025-05-16 NOTE — PROGRESS NOTE ADULT - SUBJECTIVE AND OBJECTIVE BOX
[facility] -- Jose Antonio Hoang Pannella, Patel, Savella Goodger, Cohen  Office # [phone]      Follow Up:  Abn ekg     Subjective/Observations:   No events overnight resting comfortably in bed.  No complaints of chest pain, dyspnea, or palpitations reported. No signs of orthopnea or PND.  on room air     REVIEW OF SYSTEMS: All other review of systems is negative unless indicated above    PAST MEDICAL & SURGICAL HISTORY:  Knee injury      Low back pain      Unilateral primary osteoarthritis, right knee      Hypertension      H/O knee surgery  left replacement 10/28/20      H/O hammer toe correction  2020          MEDICATIONS  (STANDING):  apixaban 5 milliGRAM(s) Oral every 12 hours  metoprolol succinate ER 25 milliGRAM(s) Oral every 24 hours  traMADol 50 milliGRAM(s) Oral two times a day    MEDICATIONS  (PRN):  acetaminophen     Tablet .. 650 milliGRAM(s) Oral every 6 hours PRN Temp greater or equal to 38C (100.4F), Mild Pain (1 - 3)  hydrOXYzine hydrochloride 25 milliGRAM(s) Oral two times a day PRN for anxiety  meclizine 25 milliGRAM(s) Oral every 6 hours PRN Dizziness  melatonin 3 milliGRAM(s) Oral at bedtime PRN Insomnia      Allergies    No Known Allergies    Intolerances        Vital Signs Last 24 Hrs  T(C): 36.9 (16 May 2025 04:22), Max: 37.2 (15 May 2025 13:09)  T(F): 98.4 (16 May 2025 04:22), Max: 98.9 (15 May 2025 13:09)  HR: 63 (16 May 2025 04:22) (63 - 92)  BP: 154/99 (16 May 2025 04:22) (130/96 - 154/99)  BP(mean): --  RR: 18 (16 May 2025 04:22) (16 - 18)  SpO2: 94% (16 May 2025 04:22) (94% - 97%)    Parameters below as of 16 May 2025 04:22  Patient On (Oxygen Delivery Method): room air        I&O's Summary    Weight (kg): 113.4 (05-15 @ 13:09)    PHYSICAL EXAM:  TELE: SR  Constitutional: NAD, awake and alert, obese   HEENT: Moist Mucous Membranes, Anicteric  Pulmonary: Non-labored, breath sounds are clear bilaterally, No wheezing, crackles or rhonchi  Cardiovascular: Regular, S1 and S2 nl, No murmurs, rubs, gallops or clicks  Gastrointestinal: Bowel Sounds present, soft, nontender.   Lymph: No lymphadenopathy. No peripheral edema.  Skin: No visible rashes or ulcers.  Psych:  Mood & affect appropriate    LABS: All Labs Reviewed:                        14.4   8.13  )-----------( 223      ( 16 May 2025 05:54 )             42.7                         15.6   8.10  )-----------( 255      ( 15 May 2025 13:40 )             45.1     16 May 2025 05:54    142    |  110    |  20     ----------------------------<  90     4.0     |  27     |  1.20   15 May 2025 13:40    142    |  111    |  20     ----------------------------<  108    3.8     |  26     |  1.30     Ca    8.8        16 May 2025 05:54  Ca    8.6        15 May 2025 13:40  Phos  2.4       16 May 2025 05:54  Mg     2.1       16 May 2025 05:54  Mg     1.9       15 May 2025 13:40    TPro  7.4    /  Alb  3.5    /  TBili  0.5    /  DBili  x      /  AST  13     /  ALT  23     /  AlkPhos  62     15 May 2025 13:40    PT/INR - ( 15 May 2025 13:40 )   PT: 10.6 sec;   INR: 0.91 ratio         PTT - ( 15 May 2025 13:40 )  PTT:29.7 sec         EC Lead ECG:   Ventricular Rate 96 BPM    QRS Duration 76 ms    Q-T Interval 360 ms    QTC Calculation(Bazett) 454 ms    R Axis -12 degrees    T Axis 122 degrees    Diagnosis Line Atrial fibrillation  Left ventricular hypertrophy with repolarization abnormality (R in aVL )  Abnormal ECG  When compared with ECG of 08-MAY-2025 19:48,  Atrial fibrillation has replaced Sinus rhythm  Confirmed by Nessa Kate ([id number]) on 5/15/2025 3:24:39 PM (05-15-25 @ 13:20)        Radiology:

## 2025-05-16 NOTE — DISCHARGE NOTE NURSING/CASE MANAGEMENT/SOCIAL WORK - PATIENT PORTAL LINK FT
You can access the FollowMyHealth Patient Portal offered by [facility] by registering at the following website: [email or web address]. By joining [employer]’s FollowMyHealth portal, you will also be able to view your health information using other applications (apps) compatible with our system.

## 2025-05-16 NOTE — CARE COORDINATION ASSESSMENT. - OTHER PERTINENT REFERRAL INFORMATION
Admitted due to Afib. SW met with pt at bedside. Pt reports living in private home with wife, 3 steps to enter. Pt reports ambulating independently with no use of DME. No history of home Oxygen or Homecare. Pt is independent with all ADL's.

## 2025-05-16 NOTE — DISCHARGE NOTE NURSING/CASE MANAGEMENT/SOCIAL WORK - NSDCPEFALRISK_GEN_ALL_CORE
For information on Fall & Injury Prevention, visit: [email or web address] OR  [email or web address] OR  https://www.cdc.gov/steadi/patient.html

## 2025-05-16 NOTE — CARE COORDINATION ASSESSMENT. - NSPASTMEDSURGHISTORY_GEN_ALL_CORE_FT
PAST MEDICAL & SURGICAL HISTORY:  Knee injury      Low back pain      Hypertension      Unilateral primary osteoarthritis, right knee      H/O hammer toe correction  July 2020      H/O knee surgery  left replacement 10/28/20

## 2025-05-16 NOTE — PROGRESS NOTE ADULT - NS ATTEND AMEND GEN_ALL_CORE FT
65-year-old male with PMHx of HTN sent from his PMD office due to rapid A-fib found today    Presenting with afib new onset   - EKG on presentation shows afib 96bpm   -tele now converted to SR   - started on Eliquis BID   -Continue BB   -EP fu, now deferred xi dccv as pt converted on his own to SR   - BP elevated, can resume norvasc, continue BB and change to long acting  to Fu Dr Kate on DC

## 2025-05-16 NOTE — CARE COORDINATION ASSESSMENT. - NSCAREPROVIDERS_GEN_ALL_CORE_FT
CARE PROVIDERS:  Accepting Physician: Mikael Barksdale  Administration: Mikael Barksdale  Administration: Radha Connor  Administration: Uday Minor  Admitting: Mikael Barksdale  Attending: Mikael Barksdale  Consultant: Nessa Kate  Consultant: Carloz Ralph  Consultant: Rip Vivar  Consultant: Seferino Rios  Covering Team: Meg Torres  ED ACP: Jaya Chamberlainica  ED Attending: Ranjit Isabel  ED Nurse: Natalie Yeung  Emergency Medicine: BulmaroJennifer  Emergency Medicine: Ranjit Isabel  Emergency Medicine: Ana Ace  Nurse: Delia Vega  Nurse: Radu Corbin  Nurse: Tegan Gan  Nurse: Natalie Yeung  Nurse: Josey Hayes  Ordered: Doctor, Unknown  Override: Radu Corbin  Override: Celestina Lovelace  PCA/Nursing Assistant: Maame Alvarez  Primary Team: Raya Camara  Primary Team: Phoenix Joshi  Primary Team: Sunny Woo  Primary Team: Adal Sands  Primary Team: Diane Meeks  Registered Dietitian: Michelle Turner  Respiratory Therapy: Radha Lind  : Jesus Grant  Team: [facility], Team  UR// Supp. Assoc.: Carole Cervantes

## 2025-05-16 NOTE — DISCHARGE NOTE PROVIDER - CARE PROVIDER_API CALL
Keep your scheduled appointment with your provider.    Call your Doctor if you have any of the following:  Temperature above 100 degrees  Nausea, vomiting and/or diarrhea  Severe headache, dizziness, or blurred vision  Notable increase in swelling of hands or feet  Notable swelling of face and lips  Difficulty, pain or burning with urination  Foul smelling vaginal discharge  Decreased fetal movement    Come to the hospital if you have any of the following symptoms:  Your water breaks  More than 4-6 contractions in 1 hour for 2 or more hours  Vaginal bleeding like a period    After 28 weeks, you should feel 10 distinct fetal movements within a 2 hour period.    It is recommended that you drink 1/2 a gallon of water each day.  Tea, Soda and Juice are  in addition to this.    
Nessa Kate  Cardiology  [address] [place] [address]  Phone: [phone]  Fax: [phone]  Follow Up Time:

## 2025-05-16 NOTE — DISCHARGE NOTE PROVIDER - HOSPITAL COURSE
HPI:  65 yr old M with PMHx of HTN presents to ED for rapid afib. Pt was seen at his PCP office and was told to go to ED for abnormal EKG findings. Pt does not see a cardiologist. Pt was seen in ED about 1 week ago due to HAs and dizziness for a week. Reports occasional feeling of unsteadiness when he first stands up. Reports intermittent right sided pulsatile HA. Denies chest pain, SOB, abdominal pain, n/v, leg pain, swelling, weakness.     ED course:  Vitals: BP: 130/96 , HR: 92 , Temp: 98.9, RR: 16 , SpO2: 97% on RA   Labs: no significant findings on labs  EKG: HR 96, QTc 454, afib, left ventricular hypertrophy with repolarization abnormality  In ED given: 1L NS x1, magnesium IV 1g x1, K+ tab 40meq x1      (15 May 2025 16:58)      ---  HOSPITAL COURSE: Patient admitted for rapid afib with RVR with plan for DCCV. Was evaluated by cardiology who recommended starting on Metoprolol and Eliquis twice a day and DCCV. As per tele, patient converted back to NSR once he got to the floors.     Patient was admitted for _____. Patient was seen and evaluated by __. Specialist saw the patient and recommended _____. Patient was treated with _____. Imaging showed/cultures showed ______. Patient's home medications for were continued inpatient. Patient underwent ___. Physical therapy evaluated the patient and recommended -----. Social work/case management followed the patient case. Patient is stable for discharge to [[place]      ---  CONSULTANTS:     ---  TIME SPENT:  I, the attending physician, was physically present for the key portions of the evaluation and management (E/M) service provided. The total amount of time spent reviewing the hospital notes, laboratory values, imaging findings, assessing/counseling the patient, discussing with consultant physicians, social work, nursing staff was -- minutes    ---  Primary care provider was made aware of plan for discharge:      [  ] NO     [  ] YES   HPI:  65 yr old M with PMHx of HTN presents to ED for rapid afib. Pt was seen at his PCP office and was told to go to ED for abnormal EKG findings. Pt does not see a cardiologist. Pt was seen in ED about 1 week ago due to HAs and dizziness for a week. Reports occasional feeling of unsteadiness when he first stands up. Reports intermittent right sided pulsatile HA. Denies chest pain, SOB, abdominal pain, n/v, leg pain, swelling, weakness.     ED course:  Vitals: BP: 130/96 , HR: 92 , Temp: 98.9, RR: 16 , SpO2: 97% on RA   Labs: no significant findings on labs  EKG: HR 96, QTc 454, afib, left ventricular hypertrophy with repolarization abnormality  In ED given: 1L NS x1, magnesium IV 1g x1, K+ tab 40meq x1      (15 May 2025 16:58)      ---  HOSPITAL COURSE: Patient admitted for rapid afib with RVR with plan for DCCV. Was evaluated by cardiology who recommended starting on Metoprolol and Eliquis twice a day and DCCV/VIRGINIE. However, patient opted to have it done the next morning. As per tele, patient converted back to NSR once he got to the floors. Patient also came with dizziness most likely 2/2 vertigo.  A CT head was done which was negative. Started on meclizine 25mg q6h prn.     Patient was admitted for _____. Patient was seen and evaluated by __. Specialist saw the patient and recommended _____. Patient was treated with _____. Imaging showed/cultures showed ______. Patient's home medications for were continued inpatient. Patient underwent ___.  Social work/case management followed the patient case. Patient is stable for discharge to [home].      ---  CONSULTANTS:   Cardiology - [facility]  EP - Dr. Ralph    ---  TIME SPENT:  I, the attending physician, was physically present for the key portions of the evaluation and management (E/M) service provided. The total amount of time spent reviewing the hospital notes, laboratory values, imaging findings, assessing/counseling the patient, discussing with consultant physicians, social work, nursing staff was -- minutes    ---  Primary care provider was made aware of plan for discharge:      [  ] NO     [  ] YES   HPI:  65 yr old M with PMHx of HTN presents to ED for rapid afib. Pt was seen at his PCP office and was told to go to ED for abnormal EKG findings. Pt does not see a cardiologist. Pt was seen in ED about 1 week ago due to HAs and dizziness for a week. Reports occasional feeling of unsteadiness when he first stands up. Reports intermittent right sided pulsatile HA. Denies chest pain, SOB, abdominal pain, n/v, leg pain, swelling, weakness.     ED course:  Vitals: BP: 130/96 , HR: 92 , Temp: 98.9, RR: 16 , SpO2: 97% on RA   Labs: no significant findings on labs  EKG: HR 96, QTc 454, afib, left ventricular hypertrophy with repolarization abnormality  In ED given: 1L NS x1, magnesium IV 1g x1, K+ tab 40meq x1      (15 May 2025 16:58)      ---  HOSPITAL COURSE: Patient admitted for rapid afib with RVR with plan for DCCV. Was evaluated by cardiology who recommended starting on Metoprolol and Eliquis twice a day and DCCV/VIRGINIE. However, patient opted to have it done the next morning. As per tele, patient converted back to NSR once he got to the floors. Patient also came with dizziness most likely 2/2 vertigo.  A CT head was done which was negative. Started on meclizine 25mg q6h prn.   VIRGINIE/DCCV canceled. Patient to continue Metoprolol BID and Eliquis - outpatient follow up . Discussed with cardio Dr Godinez and EP.    No clear evidence of acute ischemia, trops negative x 1.   No acute changes on EKG compared to previous.  No meaningful evidence of volume overload.    Stable for dc home with outpatient follow up.       VITALS:   T(C): 36.9 (05-16-25 @ 04:22), Max: 36.9 (05-16-25 @ 04:22)  HR: 63 (05-16-25 @ 04:22) (63 - 65)  BP: 154/99 (05-16-25 @ 04:22) (143/90 - 154/99)  RR: 18 (05-16-25 @ 04:22) (18 - 18)  SpO2: 94% (05-16-25 @ 04:22) (94% - 94%)    GENERAL: NAD, well-developed, well-groomed  HEENT:  AT/NC, anicteric, moist mucous membranes, EOMI, PERRL, no lid-lag, conjunctiva and sclera clear  CHEST/LUNG:  CTA b/l, no rales, wheezes, or rhonchi,  normal respiratory effort, no intercostal retractions  HEART:  RRR, S1, S2, no murmurs; no pitting edema  ABDOMEN:  BS+, soft, nontender, nondistended; No HSM  MSK/EXTREMITIES: 2+ peripheral pulses, no clubbing or cyanosis  NERVOUS SYSTEM: answers questions and follows commands appropriately A&Ox3 grossly moves all extremities  PSYCH: Appropriate affect, Alert & Awake; Good judgement

## 2025-05-16 NOTE — PROGRESS NOTE ADULT - NS ATTEND BILL GEN_ALL_CORE
Attending to bill ROM intact/no joint warmth/normal gait/strength 5/5 bilateral upper extremities/strength 5/5 bilateral lower extremities negative

## 2025-05-16 NOTE — PROGRESS NOTE ADULT - ASSESSMENT
65-year-old male with PMHx of HTN sent from his PMD office due to rapid A-fib found today    Presenting with afib new onset   - EKG on presentation shows afib 96bpm   - No prior hx of afib   -tele now converted to SR   - started on Eliquis BID   -Continue BB   -EP fu, now deferred xi dccv as pt converted on his own to SR     - No clear evidence of acute ischemia, trops negative x 1.   - No acute changes on EKG compared to previous.    - No meaningful evidence of volume overload.    - BP elevated, can resume norvasc, continue BB   - Would dc amlodipine and start metoprolol succinate 25mg PO qd     - Monitor and replete lytes, keep K>4, Mg>2.    to Fu Dr Kate on DC

## 2025-05-16 NOTE — DISCHARGE NOTE PROVIDER - NSDCCPCAREPLAN_GEN_ALL_CORE_FT
PRINCIPAL DISCHARGE DIAGNOSIS  Diagnosis: Atrial fibrillation  Assessment and Plan of Treatment: You came in for afib    Patient admitted for rapid afib with RVR with plan for DCCV. Was evaluated by cardiology who recommended starting on Metoprolol and Eliquis twice a day and DCCV/VIRGINIE. However, patient opted to have it done the next morning. As per tele, patient converted back to NSR once he got to the floors. Patient also came with dizziness most likely 2/2 vertigo.  A CT head was done which was negative. Started on meclizine 25mg q6h prn.     PRINCIPAL DISCHARGE DIAGNOSIS  Diagnosis: Atrial fibrillation  Assessment and Plan of Treatment: You presented with headaches and dizziness to the ED. You went to your PCP who found that you had afib in the office and sent you to the ED. Here at [facility], you were in afib with RVR. Cardio and EP were consulted. They recommended starting you on metoprolol and eliquis with plan for VIRGINIE and cardioversion. You said that you wanted to wait until the next morning to have these performed. However, the next morning, the tele monitor showed that you converted back to normal sinus rhythm.   Please START to take metoprolol 25mg daily.   Please START to take Eliquis 5m twice a day.  Please follow up with your PCP within 1-2 weeks upon discharge.  Please follow up with cardiology within 1-2 weeks upon discharge.      SECONDARY DISCHARGE DIAGNOSES  Diagnosis: Vertigo  Assessment and Plan of Treatment: You came in reporting dizziness when standing up or with positional changes. This is most likely due to vertigo. A CT head was performed which was negative for any significant findings. You were started on meclizine 25mg as needed.  Please continue to take meclizine 25mg every 6 hours as needed.  If your symptoms continue to worsen, please return to the ED as soon as possible.     PRINCIPAL DISCHARGE DIAGNOSIS  Diagnosis: Atrial fibrillation  Assessment and Plan of Treatment: You presented with headaches and dizziness to the ED. You went to your PCP who found that you had afib in the office and sent you to the ED. Cardio and EP were consulted. They recommended starting you on metoprolol and eliquis with plan for VIRGINIE and cardioversion. You self converted back to normal sinus rhythm and the procedure was canceled.   Please START to take metoprolol 25mg daily.   Please START to take Eliquis 5mg twice a day.  Please follow up with your PCP within 1-2 weeks upon discharge.  Please follow up with cardiology within 1-2 weeks upon discharge.      SECONDARY DISCHARGE DIAGNOSES  Diagnosis: Vertigo  Assessment and Plan of Treatment: You came in reporting dizziness when standing up or with positional changes. This is most likely due to vertigo. A CT head was performed which was negative for any significant findings. You were started on meclizine 25mg as needed.  Please continue to take meclizine 25mg  as needed.  If your symptoms continue to worsen, please return to the ED as soon as possible.
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
## 2025-05-20 RX ORDER — HYDROXYZINE HYDROCHLORIDE 25 MG/1
25 TABLET ORAL
Refills: 0 | Status: ACTIVE | COMMUNITY

## 2025-05-22 ENCOUNTER — NON-APPOINTMENT (OUTPATIENT)
Age: 65
End: 2025-05-22

## 2025-05-22 ENCOUNTER — APPOINTMENT (OUTPATIENT)
Dept: CARDIOLOGY | Facility: CLINIC | Age: 65
End: 2025-05-22
Payer: COMMERCIAL

## 2025-05-22 VITALS
SYSTOLIC BLOOD PRESSURE: 163 MMHG | HEIGHT: 69 IN | HEART RATE: 83 BPM | OXYGEN SATURATION: 93 % | DIASTOLIC BLOOD PRESSURE: 91 MMHG

## 2025-05-22 VITALS — SYSTOLIC BLOOD PRESSURE: 136 MMHG | DIASTOLIC BLOOD PRESSURE: 89 MMHG

## 2025-05-22 DIAGNOSIS — R94.31 ABNORMAL ELECTROCARDIOGRAM [ECG] [EKG]: ICD-10-CM

## 2025-05-22 DIAGNOSIS — I48.0 PAROXYSMAL ATRIAL FIBRILLATION: ICD-10-CM

## 2025-05-22 DIAGNOSIS — R73.03 PREDIABETES.: ICD-10-CM

## 2025-05-22 DIAGNOSIS — I10 ESSENTIAL (PRIMARY) HYPERTENSION: ICD-10-CM

## 2025-05-22 PROCEDURE — 99215 OFFICE O/P EST HI 40 MIN: CPT | Mod: 25

## 2025-05-22 PROCEDURE — 93000 ELECTROCARDIOGRAM COMPLETE: CPT

## 2025-05-22 RX ORDER — APIXABAN 5 MG/1
5 TABLET, FILM COATED ORAL
Qty: 90 | Refills: 0 | Status: ACTIVE | COMMUNITY
Start: 1900-01-01 | End: 1900-01-01

## 2025-05-22 RX ORDER — METOPROLOL SUCCINATE 25 MG/1
25 TABLET, EXTENDED RELEASE ORAL DAILY
Qty: 90 | Refills: 2 | Status: ACTIVE | COMMUNITY
Start: 1900-01-01 | End: 1900-01-01

## 2025-05-22 RX ORDER — AMLODIPINE BESYLATE 5 MG/1
5 TABLET ORAL DAILY
Qty: 90 | Refills: 0 | Status: ACTIVE | COMMUNITY
Start: 2025-05-16 | End: 1900-01-01

## 2025-05-29 ENCOUNTER — NON-APPOINTMENT (OUTPATIENT)
Age: 65
End: 2025-05-29

## 2025-05-29 ENCOUNTER — APPOINTMENT (OUTPATIENT)
Dept: CARDIOLOGY | Facility: CLINIC | Age: 65
End: 2025-05-29
Payer: COMMERCIAL

## 2025-05-29 ENCOUNTER — APPOINTMENT (OUTPATIENT)
Facility: CLINIC | Age: 65
End: 2025-05-29
Payer: COMMERCIAL

## 2025-05-29 VITALS
BODY MASS INDEX: 36.29 KG/M2 | HEART RATE: 66 BPM | WEIGHT: 245 LBS | SYSTOLIC BLOOD PRESSURE: 164 MMHG | HEIGHT: 69 IN | OXYGEN SATURATION: 98 % | DIASTOLIC BLOOD PRESSURE: 102 MMHG | TEMPERATURE: 98 F

## 2025-05-29 PROCEDURE — 99214 OFFICE O/P EST MOD 30 MIN: CPT | Mod: 25

## 2025-05-29 PROCEDURE — 93000 ELECTROCARDIOGRAM COMPLETE: CPT

## 2025-05-29 PROCEDURE — 95800 SLP STDY UNATTENDED: CPT | Mod: TC

## 2025-05-29 RX ORDER — MECLIZINE HYDROCHLORIDE 25 MG/1
25 TABLET ORAL
Refills: 0 | Status: ACTIVE | COMMUNITY

## 2025-07-25 ENCOUNTER — RX RENEWAL (OUTPATIENT)
Age: 65
End: 2025-07-25

## 2025-09-03 ENCOUNTER — APPOINTMENT (OUTPATIENT)
Dept: CARDIOLOGY | Facility: CLINIC | Age: 65
End: 2025-09-03

## 2025-09-12 ENCOUNTER — RX RENEWAL (OUTPATIENT)
Age: 65
End: 2025-09-12